# Patient Record
Sex: FEMALE | Race: WHITE | NOT HISPANIC OR LATINO | Employment: UNEMPLOYED | URBAN - METROPOLITAN AREA
[De-identification: names, ages, dates, MRNs, and addresses within clinical notes are randomized per-mention and may not be internally consistent; named-entity substitution may affect disease eponyms.]

---

## 2018-10-19 ENCOUNTER — OFFICE VISIT (OUTPATIENT)
Dept: FAMILY MEDICINE CLINIC | Facility: CLINIC | Age: 48
End: 2018-10-19

## 2018-10-19 VITALS
OXYGEN SATURATION: 98 % | SYSTOLIC BLOOD PRESSURE: 120 MMHG | HEART RATE: 83 BPM | BODY MASS INDEX: 20.82 KG/M2 | HEIGHT: 68 IN | DIASTOLIC BLOOD PRESSURE: 80 MMHG | TEMPERATURE: 98.3 F | WEIGHT: 137.4 LBS

## 2018-10-19 DIAGNOSIS — F98.8 ATTENTION DEFICIT DISORDER (ADD) WITHOUT HYPERACTIVITY: Primary | ICD-10-CM

## 2018-10-19 PROCEDURE — 99204 OFFICE O/P NEW MOD 45 MIN: CPT | Performed by: NURSE PRACTITIONER

## 2018-10-19 RX ORDER — BUPROPION HYDROCHLORIDE 150 MG/1
150 TABLET ORAL DAILY
Qty: 30 TABLET | Refills: 5 | Status: SHIPPED | OUTPATIENT
Start: 2018-10-19 | End: 2019-01-31

## 2018-10-22 NOTE — PROGRESS NOTES
Subjective   Kera Sage is a 48 y.o. female presents to [facility]. She has a history of Attention deficit disorder and previously prescribed Vyvanse. She has tried concerta and it seemed to work well for her. She had multiple testing completed at different offices. She has an integrated physician that she also sees. She is having more depressive symptoms recently due to unmanaged ADD.         ADD   This is a chronic problem. The current episode started more than 1 year ago. The problem occurs daily. The problem has been unchanged. Pertinent negatives include no abdominal pain, anorexia, arthralgias, change in bowel habit, chest pain, chills, congestion, coughing, diaphoresis, fatigue, fever, headaches, joint swelling, myalgias, nausea, neck pain, numbness, rash, sore throat, swollen glands, urinary symptoms, vertigo, visual change, vomiting or weakness. Nothing aggravates the symptoms. Treatments tried: vyvanse, concerta. The treatment provided moderate relief.        The following portions of the patient's history were reviewed and updated as appropriate: allergies, current medications, past family history, past medical history, past social history, past surgical history and problem list.    Review of Systems   Constitutional: Negative.  Negative for chills, diaphoresis, fatigue and fever.   HENT: Negative.  Negative for congestion and sore throat.    Eyes: Negative.    Respiratory: Negative.  Negative for cough.    Cardiovascular: Negative.  Negative for chest pain.   Gastrointestinal: Negative.  Negative for abdominal pain, anorexia, change in bowel habit, nausea and vomiting.   Endocrine: Negative.    Genitourinary: Negative.    Musculoskeletal: Negative.  Negative for arthralgias, joint swelling, myalgias and neck pain.   Skin: Negative.  Negative for rash.   Allergic/Immunologic: Negative.    Neurological: Negative.  Negative for vertigo, weakness, numbness and headaches.   Hematological: Negative.     Psychiatric/Behavioral: Positive for decreased concentration and dysphoric mood. Negative for self-injury, sleep disturbance and suicidal ideas. The patient is not nervous/anxious.        Objective   Physical Exam   Constitutional: She is oriented to person, place, and time. She appears well-developed and well-nourished.   HENT:   Head: Normocephalic and atraumatic.   Right Ear: Tympanic membrane and ear canal normal.   Left Ear: Tympanic membrane and ear canal normal.   Nose: Nose normal.   Mouth/Throat: Uvula is midline, oropharynx is clear and moist and mucous membranes are normal.   Eyes: Pupils are equal, round, and reactive to light. Conjunctivae are normal.   Neck: Neck supple.   Cardiovascular: Normal rate, regular rhythm and normal heart sounds.  Exam reveals no gallop and no friction rub.    No murmur heard.  Pulmonary/Chest: Effort normal and breath sounds normal. No respiratory distress. She has no wheezes. She has no rales.   Abdominal: Soft. Bowel sounds are normal. She exhibits no distension. There is no tenderness.   Neurological: She is alert and oriented to person, place, and time.   Skin: Skin is warm and dry.   Psychiatric: She has a normal mood and affect.   Vitals reviewed.      Assessment/Plan   Kera was seen today for get established and add.    Diagnoses and all orders for this visit:    Attention deficit disorder (ADD) without hyperactivity    Other orders  -     buPROPion XL (WELLBUTRIN XL) 150 MG 24 hr tablet; Take 1 tablet by mouth Daily.    will defer treatment to psychiatry for further management of ADD  Will start wellbutrin daily, may benefit from medication for ADD, may increase to 300 mg if tolerates well  Follow up with Dr. Aguilar as scheduled  Labs with dr aguilar

## 2019-03-27 ENCOUNTER — OFFICE VISIT (OUTPATIENT)
Dept: FAMILY MEDICINE CLINIC | Facility: CLINIC | Age: 49
End: 2019-03-27

## 2019-03-27 VITALS
WEIGHT: 138.6 LBS | SYSTOLIC BLOOD PRESSURE: 118 MMHG | RESPIRATION RATE: 20 BRPM | DIASTOLIC BLOOD PRESSURE: 82 MMHG | HEIGHT: 68 IN | HEART RATE: 99 BPM | OXYGEN SATURATION: 98 % | TEMPERATURE: 99.1 F | BODY MASS INDEX: 21.01 KG/M2

## 2019-03-27 DIAGNOSIS — Z12.11 COLON CANCER SCREENING: ICD-10-CM

## 2019-03-27 DIAGNOSIS — N64.4 BREAST PAIN, RIGHT: Primary | ICD-10-CM

## 2019-03-27 DIAGNOSIS — Z13.220 SCREENING FOR HYPERLIPIDEMIA: ICD-10-CM

## 2019-03-27 DIAGNOSIS — Z00.00 ANNUAL PHYSICAL EXAM: ICD-10-CM

## 2019-03-27 PROCEDURE — 99396 PREV VISIT EST AGE 40-64: CPT | Performed by: FAMILY MEDICINE

## 2019-03-27 PROCEDURE — 99213 OFFICE O/P EST LOW 20 MIN: CPT | Performed by: FAMILY MEDICINE

## 2019-03-27 RX ORDER — BUPROPION HYDROCHLORIDE 150 MG/1
1 TABLET ORAL DAILY
COMMUNITY
Start: 2019-03-11 | End: 2020-01-10 | Stop reason: SDUPTHER

## 2019-03-27 RX ORDER — MAGNESIUM OXIDE/MAG AA CHELATE 300 MG
300 CAPSULE ORAL NIGHTLY
COMMUNITY
End: 2022-08-22

## 2019-03-27 NOTE — PROGRESS NOTES
"Chief Complaint   Patient presents with   • Establish Care     NP to [facility]   • Advice Only     DEXA colonoscopy    • Breast Pain     right breast intermitent pain has OBGYN doesn't mammo    Previously seen by Dr. Mayuri Ramos. Pt is new to me, problems are new to me.    Subjective   Kera Sage is a 48 y.o. female.     History of Present Illness   ADD  She was tested about 6-7 years ago and is taking lisdexamfetamine. She continues to have trouble with learning. For 2 decades she says she trouble with short term memory, quick recall. She has had a neurologist but they attribute to her ADD and she knows that is not it. She also sees an integrative diet. She  Has disorder with sulfur metabolism. She still has elevated liver enzymes.  Was started on Keppra and her liver enzymes became elevated. She was on Keppra for over one year.  She sees Dr. Suze Cruz she is a neuropsychiatrist and has only seen her one time.  Her LMP was 3/12/19, very irregular. Won't have for few months then will bleed heavy    Breast pain  She has inverted nipple and breast imaging. Her last mammogram was 3/5/18 and was normal. She has hx of breast implants x2. will just have intermittent aching. She had breast augmentation surgery around age 23 years. Said she had textured implants and now they are on trial for increased risks of \"immune cancer\" she had explant surgery and new implants after her twins were 3 yo (now 16) and since those were in she had them removed in 8/2017. She also has inverted nipple on the right. She has screening mammogram for 7/2019 with gyn.    Colon cancer screening is due she just turned 48 years old.     The following portions of the patient's history were reviewed and updated as appropriate: allergies, current medications, past family history, past medical history, past social history, past surgical history and problem list.    Review of Systems   Constitutional: Negative for activity change, appetite change and " "unexpected weight change.   Respiratory: Negative for shortness of breath.    Cardiovascular: Negative for chest pain and leg swelling.   Gastrointestinal: Negative for blood in stool, constipation and diarrhea.       Vitals:    03/27/19 1519   BP: 118/82   BP Location: Right arm   Patient Position: Sitting   Cuff Size: Adult   Pulse: 99   Resp: 20   Temp: 99.1 °F (37.3 °C)   TempSrc: Oral   SpO2: 98%   Weight: 62.9 kg (138 lb 9.6 oz)   Height: 172.7 cm (68\")       Objective   Physical Exam   Constitutional: She is oriented to person, place, and time. She appears well-nourished. No distress.   HENT:   Right Ear: External ear normal.   Left Ear: External ear normal.   Nose: Nose normal.   Mouth/Throat: Oropharynx is clear and moist. No oropharyngeal exudate.   Bilateral ear canals and tympanic membranes appear normal.   Eyes: Conjunctivae and EOM are normal. Right eye exhibits no discharge. Left eye exhibits no discharge. No scleral icterus.   Neck: Neck supple. No thyromegaly present.   Cardiovascular: Normal rate, regular rhythm, normal heart sounds and intact distal pulses. Exam reveals no gallop and no friction rub.   No murmur heard.  Pulmonary/Chest: Effort normal and breath sounds normal. No respiratory distress. She has no wheezes. She has no rales. She exhibits no tenderness. Right breast exhibits no inverted nipple, no mass, no nipple discharge, no skin change and no tenderness. Left breast exhibits no inverted nipple, no mass, no nipple discharge, no skin change and no tenderness.   Abdominal: Soft. Bowel sounds are normal. She exhibits no distension and no mass. There is no tenderness. There is no guarding.   Musculoskeletal: She exhibits no edema or deformity.   Lymphadenopathy:     She has no cervical adenopathy.   Neurological: She is alert and oriented to person, place, and time. She exhibits normal muscle tone. Coordination normal.   Skin: Skin is warm and dry.   Psychiatric: She has a normal mood " and affect. Her behavior is normal.   Vitals reviewed.  she has inverted nipple on the right sided. She also has a tiny pustule in the right lower quadrant of the right breast.     Assessment/Plan   Kera was seen today for establish care, advice only and breast pain.    Diagnoses and all orders for this visit:    Breast pain, right  -     Mammo diagnostic right w CAD; Future  -     US breast right complete; Future    Colon cancer screening  -     Ambulatory Referral For Screening Colonoscopy    Screening for hyperlipidemia  -     Lipid Panel    Annual physical exam  -     Comprehensive Metabolic Panel  -     CBC & Differential  -     Lipid Panel  We discussed the importance of regular physical activity.  Cardiovascular activity for the equivalent of 30 minutes 5 days per week.  We also discussed the importance of healthy diet to avoid weight gain and sugar intolerance.  I recommend predominantly filling the diet with vegetables and lean meats followed by fruits and whole grains.  I also recommend overall avoiding processed foods.

## 2019-03-28 LAB
ALBUMIN SERPL-MCNC: 4.8 G/DL (ref 3.5–5.2)
ALBUMIN/GLOB SERPL: 2.1 G/DL
ALP SERPL-CCNC: 73 U/L (ref 39–117)
ALT SERPL-CCNC: 22 U/L (ref 1–33)
AST SERPL-CCNC: 20 U/L (ref 1–32)
BASOPHILS # BLD AUTO: 0.04 10*3/MM3 (ref 0–0.2)
BASOPHILS NFR BLD AUTO: 0.6 % (ref 0–1.5)
BILIRUB SERPL-MCNC: 0.4 MG/DL (ref 0.2–1.2)
BUN SERPL-MCNC: 18 MG/DL (ref 6–20)
BUN/CREAT SERPL: 23.7 (ref 7–25)
CALCIUM SERPL-MCNC: 9.6 MG/DL (ref 8.6–10.5)
CHLORIDE SERPL-SCNC: 100 MMOL/L (ref 98–107)
CHOLEST SERPL-MCNC: 146 MG/DL (ref 0–200)
CO2 SERPL-SCNC: 26.7 MMOL/L (ref 22–29)
CREAT SERPL-MCNC: 0.76 MG/DL (ref 0.57–1)
EOSINOPHIL # BLD AUTO: 0.03 10*3/MM3 (ref 0–0.4)
EOSINOPHIL NFR BLD AUTO: 0.4 % (ref 0.3–6.2)
ERYTHROCYTE [DISTWIDTH] IN BLOOD BY AUTOMATED COUNT: 12.6 % (ref 12.3–15.4)
GLOBULIN SER CALC-MCNC: 2.3 GM/DL
GLUCOSE SERPL-MCNC: 96 MG/DL (ref 65–99)
HCT VFR BLD AUTO: 44.9 % (ref 34–46.6)
HDLC SERPL-MCNC: 59 MG/DL (ref 40–60)
HGB BLD-MCNC: 14.4 G/DL (ref 12–15.9)
IMM GRANULOCYTES # BLD AUTO: 0.02 10*3/MM3 (ref 0–0.05)
IMM GRANULOCYTES NFR BLD AUTO: 0.3 % (ref 0–0.5)
LDLC SERPL CALC-MCNC: 57 MG/DL (ref 0–100)
LYMPHOCYTES # BLD AUTO: 1.57 10*3/MM3 (ref 0.7–3.1)
LYMPHOCYTES NFR BLD AUTO: 23 % (ref 19.6–45.3)
MCH RBC QN AUTO: 30.3 PG (ref 26.6–33)
MCHC RBC AUTO-ENTMCNC: 32.1 G/DL (ref 31.5–35.7)
MCV RBC AUTO: 94.5 FL (ref 79–97)
MONOCYTES # BLD AUTO: 0.48 10*3/MM3 (ref 0.1–0.9)
MONOCYTES NFR BLD AUTO: 7 % (ref 5–12)
NEUTROPHILS # BLD AUTO: 4.7 10*3/MM3 (ref 1.4–7)
NEUTROPHILS NFR BLD AUTO: 68.7 % (ref 42.7–76)
NRBC BLD AUTO-RTO: 0 /100 WBC (ref 0–0)
PLATELET # BLD AUTO: 339 10*3/MM3 (ref 140–450)
POTASSIUM SERPL-SCNC: 4 MMOL/L (ref 3.5–5.2)
PROT SERPL-MCNC: 7.1 G/DL (ref 6–8.5)
RBC # BLD AUTO: 4.75 10*6/MM3 (ref 3.77–5.28)
SODIUM SERPL-SCNC: 140 MMOL/L (ref 136–145)
TRIGL SERPL-MCNC: 148 MG/DL (ref 0–150)
VLDLC SERPL CALC-MCNC: 29.6 MG/DL (ref 5–40)
WBC # BLD AUTO: 6.84 10*3/MM3 (ref 3.4–10.8)

## 2019-04-01 ENCOUNTER — TELEPHONE (OUTPATIENT)
Dept: FAMILY MEDICINE CLINIC | Facility: CLINIC | Age: 49
End: 2019-04-01

## 2019-04-01 NOTE — TELEPHONE ENCOUNTER
Nataliya from the mammo dept called and said the order in the chart needs to be changed to bilateral instead of just the right

## 2019-04-02 DIAGNOSIS — N64.4 BREAST PAIN, RIGHT: Primary | ICD-10-CM

## 2019-04-08 ENCOUNTER — HOSPITAL ENCOUNTER (OUTPATIENT)
Dept: ULTRASOUND IMAGING | Facility: HOSPITAL | Age: 49
Discharge: HOME | End: 2019-04-08
Admitting: FAMILY MEDICINE

## 2019-04-08 ENCOUNTER — HOSPITAL ENCOUNTER (OUTPATIENT)
Dept: MAMMOGRAPHY | Facility: HOSPITAL | Age: 49
Discharge: HOME | End: 2019-04-08

## 2019-04-08 DIAGNOSIS — N64.4 BREAST PAIN, RIGHT: ICD-10-CM

## 2019-04-08 PROCEDURE — G0279 TOMOSYNTHESIS, MAMMO: HCPCS

## 2019-04-08 PROCEDURE — 76642 ULTRASOUND BREAST LIMITED: CPT

## 2019-04-08 PROCEDURE — 77066 DX MAMMO INCL CAD BI: CPT

## 2019-05-28 ENCOUNTER — OFFICE VISIT (OUTPATIENT)
Dept: NEUROLOGY | Facility: CLINIC | Age: 49
End: 2019-05-28

## 2019-05-28 VITALS
DIASTOLIC BLOOD PRESSURE: 70 MMHG | WEIGHT: 165.2 LBS | OXYGEN SATURATION: 98 % | HEIGHT: 68 IN | BODY MASS INDEX: 25.04 KG/M2 | HEART RATE: 73 BPM | SYSTOLIC BLOOD PRESSURE: 104 MMHG

## 2019-05-28 DIAGNOSIS — G40.109 TEMPORAL LOBE EPILEPSY (HCC): Primary | ICD-10-CM

## 2019-05-28 PROCEDURE — 99245 OFF/OP CONSLTJ NEW/EST HI 55: CPT | Performed by: PSYCHIATRY & NEUROLOGY

## 2019-05-28 NOTE — PATIENT INSTRUCTIONS
[facility]  Court Finnegan MD  Neurology clinic  [phone]    With anti-seizure medications, you may initially notice side effects of fatigue, drowsiness, unsteadiness, and dizziness.  Other possible side effects include nausea, abdominal pain, headache, blurry or double vision, slurred speech and mood changes.  Generally, patients will noticed these symptoms when the medication is first started or with higher doses and will go away with time.    It is import to consistently take your medication every day.  Missing just one dose may put you at risk for a breakthrough seizure.  Consider using reminders on your phone or a pill box.    If you develop a rash, please call the neurology clinic immediately or notify another healthcare professional, as this may be potentially life-threatening.  If you are unable to reach a healthcare professional, go to the emergency room immediately for further evaluation.    If you develop thoughts of wanting to hurt yourself or others, please call the neurology clinic immediately to notify another healthcare professional.  If you are unable to reach a healthcare professional, go to the emergency room immediately for further evaluation.    Taking anti-seizure medications may increase the risk of birth defects.  If you are a female of child-bearing potential, it is recommended that you take folic acid (1-4 mg) daily.  This may reduce the risk of birth defects while pregnant and taking seizure medication.  If you become pregnant, contact our office immediately. You will need to be followed very closely (at least monthly appointments).  I also recommend contacting The [employer] American Antiepileptic Drug Pregnancy Registry at www.aedpregnancyregistry.org or 1-503.145.4160.    It is the [place] state law that you cannot drive within 90 days of a seizure.    You should avoid certain activities that if you were to have a seizure, you could harm yourself or others. In  general, it is recommended that you avoid operating heavy machinery or power tools, swimming or taking baths by yourself (showers are ok), don't stand over open flames, don't get on high ladders or the roof.  I also recommend to avoid sleeping on your stomach.    For further information on epilepsy and resources available to patients and their families, please visit the [facility] at www.efky.org or call [phone].    Start vimpat 50 mg twice daily (every 12 hours) for 2 weeks, then increase to 100 mg twice daily.

## 2019-05-28 NOTE — PROGRESS NOTES
Subjective:     Patient ID: Kera Sage is a 49 y.o. female.    Ms. Sage is a 49 year old right handed female with a h/o anxiety, depression, and ADHD who is seen in consultation at the request of Dr. Suze Cruz for the evaluation of a previously abnormal EEG.  When she was 23, was in a wedding, out of the blue, couldn't remember what she was told.  Has to write things down.  Feels like things aren't connected.  Started having time lapses for about 8-10 years ago.  Would be driving down the road and would look up and wasn't sure how much time had passed.  Occurs a couple of times per week.  Only notices it when driving.  Was on LEV for a couple of years, but due to elevated LFTs, it was stopped. It did help symptoms.  Was on it for 3 years when the elevated LFTs were seen.  Has a hard time with retrieval of information.  Has a hard time with spelling.  Thinks that it may be related to her breast implants.  Had them explanted in 2017.  Has MTHFR.  Doesn't absorb B vitamins and body doesn't detoxify.  Also reports that she can't metabolize sulfur.  Has also had general anesthesia several times and feels that this may also contribute.  Overall, feels that her symptoms have gotten worse.  Have changed over time.  Had dreams where she felt out of body.  Does report vivid dreams.  Also reports a h/o sleep paralysis.  Once, her vision got weird for a 10 minute period, described a scintillating scotoma.  No associated headache.  No h/o convulsions.  Last one was yesterday.  Not currently on an AED.  Just had one EEG around 2015.  Thinks that it may have been an hour recording. Wellbutrin was started this past fall.  Symptoms didn't worsen after it was started.      Risk factors:  Childhood/febrile seizures? no  Head trauma/pathology? no  CNS infections? no  Family history of seizures? no  Driving? yes  Child bearing/family planning?  had vasectomy    I reviewed the patient's records.  I reviewed Dr. Roblero's  note from 2017 that discusses a routine EEG that showed left temporal sharps.  MRI brain in 2013 was normal.  I reviewed Dr. Cruz's note.  It discusses her episodes of losing time and referral to neurology was made.  Dr. Escoto did neuropsych testing on her in 2013. Full report is scanned into the chart.  It noted subtle dysfunction on language and a measure of sustained attention.        The following portions of the patient's history were reviewed and updated as appropriate: allergies, current medications, past family history, past medical history, past social history, past surgical history and problem list.    Review of Systems   Constitutional: Negative for activity change, appetite change and fatigue.   HENT: Negative for facial swelling, sinus pressure and trouble swallowing.    Eyes: Negative for pain, redness and visual disturbance.   Respiratory: Negative for chest tightness, shortness of breath and wheezing.    Cardiovascular: Negative for chest pain, palpitations and leg swelling.   Gastrointestinal: Negative for diarrhea, nausea and vomiting.   Endocrine: Negative for cold intolerance, heat intolerance and polyphagia.   Genitourinary: Negative for difficulty urinating, frequency and urgency.   Musculoskeletal: Negative for back pain, gait problem and neck pain.   Neurological: Negative for dizziness, tremors, seizures, syncope, facial asymmetry, speech difficulty, weakness, light-headedness, numbness and headaches.   Hematological: Does not bruise/bleed easily.   Psychiatric/Behavioral: Negative for agitation, behavioral problems, confusion, decreased concentration, dysphoric mood, hallucinations, self-injury, sleep disturbance and suicidal ideas. The patient is not nervous/anxious and is not hyperactive.     I reviewed the ROS documented by the MA.      Objective:    Neurologic Exam    Physical Exam   Constitutional:  Vital signs reviewed.  No apparent distress.  Well groomed.  Eyes:  No injection, no  icterus.  Fundoscopic exam performed.  No papilledema appreciated bilaterally.   Respiratory:  Normal effort.  Clear to auscultation bilaterally.  Cardiovascular:  Regular rate and rhythm.  No murmurs.  No carotid bruits. Symmetric radial pulses.  Musculoskeletal: Normal station.  Gait steady.  Normal arm swing.  Patient able to walk on heels and toes.  Tandem gait intact.  Romberg negative.  Muscle tone and bulk normal.  Strength is 5/5 in the bilateral upper and lower extremities proximally and distally unless otherwise specified in the neurological exam.  Skin:  No rashes.  Warm, dry, and intact.  Psychiatric:  Good mood.  Normal affect.    Neurologic:  Mental status-  The patient is alert and oriented to person, place and time. Attention/concentration is within normal limits.  Speech is fluent without dysarthria.  The patient is able to name, repeat and follow complex commands without difficulty.  Immediate memory and delayed recall intact (3/3 words immediate and after 4 minutes).  Fund of knowledge normal.  Cranial nerves- Pupils equally round and reactive to light with intact accomodation.  Visual fields intact.  Extraocular movements intact.  Facial sensation intact.  Smile symmetric.  Hearing intact to finger-rub bilaterally.  Palate elevates symmetrically.  SCM and trapezius are 5/5 bilaterally.  Tongue is midline.  Motor-  See musculoskeletal above.  No tremor.  Reflexes- 2+ in the bilateral triceps, biceps, brachioradialis, patellar and achilles.  Toes down-going bilaterally.  Sensation- Intact to pinprick and vibration in bilateral upper and lower extremities symmetrically.  Coordination- Intact to finger to nose and heel knee shin bilaterally.  Intact rapid alternating movements bilaterally.  Gait- See musculoskeletal exam above.     Assessment/Plan:  Ms. Sage is a 49 year old right handed female with a h/o anxiety, depression, and ADHD who presents to the neurology clinic today for evaluation of  lost time and cognitive issues.    1.  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  Her past EEG showed left temporal sharps.  Her symptoms improved while on LEV, but not currently on an AED.  Her neuropsych testing also seems to support this diagnosis.  She reports transaminitis on LEV, which would be rare since LEV is not metabolized through the liver.  Probably not a good option for her given her h/o mood disorder.  We discussed pros/cons of LTG and LCM.  Decided to go with LCM.  Will start on 50 mg BID for 2 weeks, then go up to 100 mg BID.  Will get EKG at next f/u for monitoring.  Discussed how wellbutrin can lower seizure threshold.  If symptoms aren't improved at next f/u, consider admission to EMU and/or repeat neuropsych testing.  We discussed no driving within 90 days of a seizure.       Problems Addressed this Visit     None      Visit Diagnoses     Temporal lobe epilepsy (CMS/HCC)    -  Primary

## 2019-06-03 ENCOUNTER — PREP FOR SURGERY (OUTPATIENT)
Dept: OTHER | Facility: HOSPITAL | Age: 49
End: 2019-06-03

## 2019-06-03 DIAGNOSIS — Z83.71 FAMILY HISTORY OF COLONIC POLYPS: ICD-10-CM

## 2019-06-03 DIAGNOSIS — Z12.11 SCREENING FOR COLON CANCER: Primary | ICD-10-CM

## 2019-06-17 PROBLEM — Z83.719 FAMILY HISTORY OF COLONIC POLYPS: Status: ACTIVE | Noted: 2019-06-17

## 2019-06-17 PROBLEM — Z12.11 SCREENING FOR COLON CANCER: Status: ACTIVE | Noted: 2019-06-17

## 2019-06-17 PROBLEM — Z83.71 FAMILY HISTORY OF COLONIC POLYPS: Status: ACTIVE | Noted: 2019-06-17

## 2019-06-26 ENCOUNTER — OFFICE VISIT (OUTPATIENT)
Dept: FAMILY MEDICINE CLINIC | Facility: CLINIC | Age: 49
End: 2019-06-26

## 2019-06-26 VITALS
WEIGHT: 136.2 LBS | HEIGHT: 68 IN | TEMPERATURE: 98.5 F | HEART RATE: 80 BPM | BODY MASS INDEX: 20.64 KG/M2 | OXYGEN SATURATION: 97 % | RESPIRATION RATE: 19 BRPM | DIASTOLIC BLOOD PRESSURE: 52 MMHG | SYSTOLIC BLOOD PRESSURE: 118 MMHG

## 2019-06-26 DIAGNOSIS — F98.8 ATTENTION DEFICIT DISORDER (ADD) WITHOUT HYPERACTIVITY: ICD-10-CM

## 2019-06-26 DIAGNOSIS — R41.3 MEMORY LOSS: Primary | ICD-10-CM

## 2019-06-26 DIAGNOSIS — G40.A09: ICD-10-CM

## 2019-06-26 PROCEDURE — 99214 OFFICE O/P EST MOD 30 MIN: CPT | Performed by: FAMILY MEDICINE

## 2019-06-26 RX ORDER — ESCITALOPRAM OXALATE 10 MG/1
10 TABLET ORAL DAILY
COMMUNITY
Start: 2019-06-10 | End: 2020-01-10 | Stop reason: SDUPTHER

## 2019-06-26 RX ORDER — LISDEXAMFETAMINE DIMESYLATE 70 MG/1
70 CAPSULE ORAL DAILY
COMMUNITY
Start: 2019-06-11 | End: 2020-03-04

## 2019-06-26 RX ORDER — LACOSAMIDE 100 MG/1
100 TABLET ORAL EVERY 12 HOURS SCHEDULED
COMMUNITY
End: 2019-09-05 | Stop reason: SDUPTHER

## 2019-06-26 NOTE — PROGRESS NOTES
Chief Complaint   Patient presents with   • Breast Pain     right 3 mth folow        Subjective   Kera Sage is a 49 y.o. female.     History of Present Illness   Patient was seen recently by her gynecologist office and had her annual visit there.  Checkout good.  She had recent breast imaging related to breast pain and history of implants.  She was found to have change on the screening mammogram but then follow-up with ultrasound and diagnostic mammogram there were no concerning findings everything appeared benign.    She continues to follow up with the new psychiatrist for her meds to be in one place and she has specialty in neurology and psych.     She was recently seen by her neurologist office and is followed here related to her memory complaints.  Around age 23 she had very much difficulties with her memory, could not remember what she was told.  From here on out she has had to write things down for memory.  She has hard time with information retrieval, spelling.  She took Keppra under the guidance of neurology but related to liver enzyme elevations this was discontinued.  Questionable history of seizure but her description per neurology also sounded like scintillating scotoma.  She has been on Wellbutrin and tolerated that well which would be contraindicated if she had seizures.  Neurology has listed left temporal lobe epilepsy and suspicion for untreated seizures.  Past EEG with changes in the left temporal area with sharps.  Her symptoms did improve on Keppra but she had to be taken off of it related to transaminitis and she has not been on another antiepileptic.  Neurology also felt her neuropsych testing seem to support diagnosis of left temporal lobe epilepsy.  She was started on Vimpat at that visit and titrated up.  If she does not have improvement of her symptoms on the Vimpat they considered admitting her to evaluate further for seizures.  No driving 90 days following seizure activity.  Wellbutrin  "can lower the seizure threshold.    Pt reports that the vimpat is helping with the time lapses. It has not helped the memory. Says the time lapses started occurring after the memory lapses so she does not think the two are connected.  She has a friend with lyme disease and has exactly the same symptoms as pt does. That friend also has joint pain and other symptoms. Really loving her pilates for the mind body connection. She exercises most days.     The following portions of the patient's history were reviewed and updated as appropriate: allergies, current medications, past medical history, past social history and problem list.    Review of Systems   Respiratory: Negative.    Cardiovascular: Negative.    Neurological: Negative.        Vitals:    06/26/19 0914   BP: 118/52   BP Location: Left arm   Patient Position: Sitting   Cuff Size: Adult   Pulse: 80   Resp: 19   Temp: 98.5 °F (36.9 °C)   TempSrc: Oral   SpO2: 97%   Weight: 61.8 kg (136 lb 3.2 oz)   Height: 172.7 cm (67.99\")       Objective   Physical Exam   Constitutional: She is oriented to person, place, and time. She appears well-nourished. No distress.   Eyes: Conjunctivae are normal. No scleral icterus.   Pulmonary/Chest: Effort normal. No respiratory distress.   Neurological: She is alert and oriented to person, place, and time.   Psychiatric: She has a normal mood and affect. Her behavior is normal.   Vitals reviewed.      Assessment/Plan   Kera was seen today for breast pain.    Diagnoses and all orders for this visit:    Memory loss  -     Lyme Disease, Western Blot    Attention deficit disorder (ADD) without hyperactivity    Atypical absence seizure (CMS/HCC)    Given new information pt has learned she would like to be tested for Lyme disease, neurological lyme disease, to explain her memory lapses/loss. She reported that her contact in [place] reported that she was negative on the screening test and then positive on the reflex testing. I have ordered the " testing and pt is aware that she may have to pay for this out of pocket. We will try to get a price for her. Additionally I'm unsure if this screening will  on the neurological lyme or if that would need to be done specifically on her CSF which would need to be discussed with her neurologist.     She seems to be doing overall much better. Her breast pain has resolved. She is doing well on the vimpat as far as she has not had any further time lapse episodes. She is also in better emotional state and mood. Still trouble with the memory that is very bothersome and sense that it is unrelated to her ADD which is being treated and the time lapse, now thought to be seizures based on neurology's review of her chart.

## 2019-07-02 ENCOUNTER — APPOINTMENT (OUTPATIENT)
Dept: LAB | Facility: HOSPITAL | Age: 49
End: 2019-07-02

## 2019-07-02 PROCEDURE — 86617 LYME DISEASE ANTIBODY: CPT | Performed by: FAMILY MEDICINE

## 2019-07-02 PROCEDURE — 36415 COLL VENOUS BLD VENIPUNCTURE: CPT | Performed by: FAMILY MEDICINE

## 2019-07-09 LAB
B BURGDOR IGG PATRN SER IB-IMP: NEGATIVE
B BURGDOR IGM PATRN SER IB-IMP: NEGATIVE
B BURGDOR18KD IGG SER QL IB: NORMAL
B BURGDOR23KD IGG SER QL IB: NORMAL
B BURGDOR23KD IGM SER QL IB: NORMAL
B BURGDOR28KD IGG SER QL IB: NORMAL
B BURGDOR30KD IGG SER QL IB: NORMAL
B BURGDOR39KD IGG SER QL IB: NORMAL
B BURGDOR39KD IGM SER QL IB: NORMAL
B BURGDOR41KD IGG SER QL IB: NORMAL
B BURGDOR41KD IGM SER QL IB: NORMAL
B BURGDOR45KD IGG SER QL IB: NORMAL
B BURGDOR58KD IGG SER QL IB: NORMAL
B BURGDOR66KD IGG SER QL IB: NORMAL
B BURGDOR93KD IGG SER QL IB: NORMAL

## 2019-07-22 ENCOUNTER — OFFICE VISIT (OUTPATIENT)
Dept: NEUROLOGY | Facility: CLINIC | Age: 49
End: 2019-07-22

## 2019-07-22 VITALS
SYSTOLIC BLOOD PRESSURE: 118 MMHG | OXYGEN SATURATION: 100 % | HEART RATE: 75 BPM | DIASTOLIC BLOOD PRESSURE: 80 MMHG | BODY MASS INDEX: 20.31 KG/M2 | WEIGHT: 134 LBS | HEIGHT: 68 IN

## 2019-07-22 DIAGNOSIS — Z79.899 HIGH RISK MEDICATION USE: ICD-10-CM

## 2019-07-22 DIAGNOSIS — G40.109 TEMPORAL LOBE EPILEPSY (HCC): Primary | ICD-10-CM

## 2019-07-22 PROCEDURE — 99214 OFFICE O/P EST MOD 30 MIN: CPT | Performed by: PSYCHIATRY & NEUROLOGY

## 2019-07-22 NOTE — PATIENT INSTRUCTIONS
**Eat a high fiber diet    **Exercise regularly (physically and mentally)    **Floss daily    **Consider eating yogurt regularly    **Consider drinking green tea    **Limit soda and alcohol

## 2019-07-22 NOTE — PROGRESS NOTES
Subjective:     Patient ID: Kera Sage is a 49 y.o. female.    Ms. Sage is a 49-year-old right-handed female with a history of anxiety, depression, and ADHD who presents to neurology clinic today as an established patient for follow-up.  The patient was last seen as a new patient on May 28, 2019 for an abnormal EEG.  The patient reports in her mid 20s she was attending a wedding and out of the blue she could not remember what she was told.  She had a start writing things down.  She felt like things were connected and started having time lapses for about 8 to 10 years.  These mainly occurred while she was driving.  And would occur a couple times per week.  She was on Keppra for about 3 years and that helped her symptoms however due to elevated LFTs the medication was stopped.  She also felt that may be her symptoms related to her breast implants and had them explanted in 2017.  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  She also felt that general anesthesia may be contributing to her symptoms.  Overall she felt like her symptoms had progressively worsened.  She also reported some vivid dreams and sleep paralysis.  She had also had an episode once that sounded like a scintillating scotoma.  She just had one EEG in 2015 that reportedly showed left temporal sharps.  At her last visit I felt that her symptoms were suspicious for untreated seizures.  We started her on lacosamide.  She has been on 100 twice a day for about a month now she denies any bad side effects and overall feels like her symptoms are little better.  She does ask if may be her symptoms are related to Lyme disease today.  She reports that she got checked and everything was negative.      The following portions of the patient's history were reviewed and updated as appropriate: allergies, current medications, past family history, past medical history, past social history, past surgical  history and problem list.    Review of Systems   Constitutional: Negative for activity change, appetite change and fatigue.   HENT: Negative for congestion, sinus pressure and trouble swallowing.    Eyes: Negative for photophobia, redness and visual disturbance.   Respiratory: Negative for chest tightness, shortness of breath and wheezing.    Cardiovascular: Negative for chest pain, palpitations and leg swelling.   Gastrointestinal: Negative for abdominal pain, constipation and nausea.   Endocrine: Negative for polydipsia, polyphagia and polyuria.   Genitourinary: Negative for difficulty urinating, frequency and urgency.   Musculoskeletal: Negative for back pain, gait problem and neck pain.   Skin: Negative for color change, rash and wound.   Allergic/Immunologic: Negative for environmental allergies, food allergies and immunocompromised state.   Neurological: Negative for dizziness, tremors, seizures, syncope, facial asymmetry, speech difficulty, weakness, light-headedness, numbness and headaches.   Hematological: Negative for adenopathy. Does not bruise/bleed easily.   Psychiatric/Behavioral: Positive for sleep disturbance. Negative for agitation, behavioral problems, confusion, decreased concentration, dysphoric mood, hallucinations, self-injury and suicidal ideas. The patient is not nervous/anxious and is not hyperactive.     I reviewed the ROS documented by the MA.        Objective:    Neurologic Exam    Physical Exam    Assessment/Plan:  The patient is a 49-year-old right-handed female with a history of anxiety, depression, and ADHD who presents to neurology clinic today for follow-up.    1.  Suspected temporal lobe epilepsy-her symptoms of memory loss are likely multifactorial.  If she tested negative for Lyme it is unlikely that she has this.  It is difficult to assess whether or not she is still having possible subclinical seizures.  I would like to do further EEG monitoring.  We discussed options of  in-home monitoring, amatory EEG, and inpatient epilepsy monitoring unit.  We decided to do the in-home monitoring.  I will continue her lacosamide today at 100 mill grams twice a day.  That can always be further titrated if needed.  I would also like to get an EKG for drug monitoring purposes.    A total of 25 minutes of face to face time was spent with the patient and >50% of that time was spent on counseling regarding their symptoms an plan of care.       Problems Addressed this Visit     None      Visit Diagnoses     High risk medication use    -  Primary    Relevant Orders    ECG 12 Lead    Temporal lobe epilepsy (CMS/HCC)        Relevant Orders    ECG 12 Lead    EEG In Home Monitoring

## 2019-08-15 ENCOUNTER — TELEPHONE (OUTPATIENT)
Dept: NEUROLOGY | Facility: CLINIC | Age: 49
End: 2019-08-15

## 2019-08-15 NOTE — TELEPHONE ENCOUNTER
----- Message from Sofie Calderon MA sent at 8/15/2019  9:20 AM EDT -----  Contact: [phone]      ----- Message -----  From: Aurelia Lind  Sent: 8/13/2019   9:03 AM  To: Sofie Calderon MA    Patient called just following up, to see if she is still supposed to have a EKG done.

## 2019-08-15 NOTE — TELEPHONE ENCOUNTER
----- Message from Aurelia Lind sent at 8/15/2019 11:01 AM EDT -----  Contact: [phone]  [facility] is out of network with patients insurance. Patient would like to know if there is any other way to have this test done other than through [facility].

## 2019-08-19 ENCOUNTER — ANESTHESIA EVENT (OUTPATIENT)
Dept: GASTROENTEROLOGY | Facility: HOSPITAL | Age: 49
End: 2019-08-19

## 2019-08-19 ENCOUNTER — HOSPITAL ENCOUNTER (OUTPATIENT)
Facility: HOSPITAL | Age: 49
Setting detail: HOSPITAL OUTPATIENT SURGERY
Discharge: HOME | End: 2019-08-19
Attending: SURGERY | Admitting: SURGERY

## 2019-08-19 ENCOUNTER — ANESTHESIA (OUTPATIENT)
Dept: GASTROENTEROLOGY | Facility: HOSPITAL | Age: 49
End: 2019-08-19

## 2019-08-19 VITALS
RESPIRATION RATE: 16 BRPM | WEIGHT: 137.8 LBS | BODY MASS INDEX: 20.88 KG/M2 | SYSTOLIC BLOOD PRESSURE: 119 MMHG | HEIGHT: 68 IN | DIASTOLIC BLOOD PRESSURE: 79 MMHG | TEMPERATURE: 98.2 F | HEART RATE: 58 BPM | OXYGEN SATURATION: 100 %

## 2019-08-19 PROBLEM — K57.30 DIVERTICULOSIS OF LARGE INTESTINE WITHOUT HEMORRHAGE: Status: ACTIVE | Noted: 2019-08-19

## 2019-08-19 PROBLEM — K64.8 INTERNAL HEMORRHOIDS: Status: ACTIVE | Noted: 2019-08-19

## 2019-08-19 LAB
B-HCG UR QL: NEGATIVE
INTERNAL NEGATIVE CONTROL: NEGATIVE
INTERNAL POSITIVE CONTROL: POSITIVE
Lab: NORMAL

## 2019-08-19 PROCEDURE — 25010000002 PROPOFOL 10 MG/ML EMULSION: Performed by: ANESTHESIOLOGY

## 2019-08-19 PROCEDURE — 45378 DIAGNOSTIC COLONOSCOPY: CPT | Performed by: SURGERY

## 2019-08-19 PROCEDURE — S0260 H&P FOR SURGERY: HCPCS | Performed by: SURGERY

## 2019-08-19 PROCEDURE — 81025 URINE PREGNANCY TEST: CPT | Performed by: SURGERY

## 2019-08-19 RX ORDER — SODIUM CHLORIDE 0.9 % (FLUSH) 0.9 %
3 SYRINGE (ML) INJECTION AS NEEDED
Status: DISCONTINUED | OUTPATIENT
Start: 2019-08-19 | End: 2019-08-19 | Stop reason: HOSPADM

## 2019-08-19 RX ORDER — LIDOCAINE HYDROCHLORIDE 20 MG/ML
INJECTION, SOLUTION INFILTRATION; PERINEURAL AS NEEDED
Status: DISCONTINUED | OUTPATIENT
Start: 2019-08-19 | End: 2019-08-19 | Stop reason: SURG

## 2019-08-19 RX ORDER — PROPOFOL 10 MG/ML
VIAL (ML) INTRAVENOUS CONTINUOUS PRN
Status: DISCONTINUED | OUTPATIENT
Start: 2019-08-19 | End: 2019-08-19 | Stop reason: SURG

## 2019-08-19 RX ORDER — PROPOFOL 10 MG/ML
VIAL (ML) INTRAVENOUS AS NEEDED
Status: DISCONTINUED | OUTPATIENT
Start: 2019-08-19 | End: 2019-08-19 | Stop reason: SURG

## 2019-08-19 RX ORDER — SODIUM CHLORIDE, SODIUM LACTATE, POTASSIUM CHLORIDE, CALCIUM CHLORIDE 600; 310; 30; 20 MG/100ML; MG/100ML; MG/100ML; MG/100ML
1000 INJECTION, SOLUTION INTRAVENOUS CONTINUOUS
Status: DISCONTINUED | OUTPATIENT
Start: 2019-08-19 | End: 2019-08-19 | Stop reason: HOSPADM

## 2019-08-19 RX ORDER — LIDOCAINE HYDROCHLORIDE 10 MG/ML
0.5 INJECTION, SOLUTION INFILTRATION; PERINEURAL ONCE AS NEEDED
Status: DISCONTINUED | OUTPATIENT
Start: 2019-08-19 | End: 2019-08-19 | Stop reason: HOSPADM

## 2019-08-19 RX ADMIN — SODIUM CHLORIDE, POTASSIUM CHLORIDE, SODIUM LACTATE AND CALCIUM CHLORIDE 1000 ML: 600; 310; 30; 20 INJECTION, SOLUTION INTRAVENOUS at 11:49

## 2019-08-19 RX ADMIN — PROPOFOL 100 MG: 10 INJECTION, EMULSION INTRAVENOUS at 12:00

## 2019-08-19 RX ADMIN — PROPOFOL 100 MCG/KG/MIN: 10 INJECTION, EMULSION INTRAVENOUS at 12:00

## 2019-08-19 RX ADMIN — LIDOCAINE HYDROCHLORIDE 100 MG: 20 INJECTION, SOLUTION INFILTRATION; PERINEURAL at 12:00

## 2019-08-19 NOTE — OP NOTE
Operative Note :  Karime Blanchard MD      Kera Sage  1970    Procedure Date: 08/19/19    Pre-op Diagnosis:  Screening for colon cancer [Z12.11]  Family history of colonic polyps [Z83.71]    Post-Operative Diagnosis:  Diverticulosis  Grade 1 internal hemorrhoids    Procedure:   Flexible colonoscopy to the cecum    Surgeon: Karime Blanchard MD    Assistant: None    Anesthesia:  MAC (monitored anesthetic care)    Estimated Blood Loss: Minimal    Specimens: None    Complications: None    Indications:  Mrs. Sage is a 49-year-old lady here for her first ever screening colonoscopy.  She has been counseled on the risks of the procedure to include bleeding, possible colon perforation, and possible missed pathology.  Despite these risks, she has consented to proceed.    Findings: Pancolonic diverticulosis, nonbleeding grade 1 internal hemorrhoids    Description of procedure:  The patient was brought to the endoscopy suite and hollie in the left lateral decubitus position.  Continuous propofol anesthesia was administered.  A surgical timeout was completed.  A digital rectal exam was performed, revealing no abnormalities.  An adult colonoscope was then inserted through the anus and passed under direct visualization to the level of the cecum.  The cecum was identified via the ileocecal valve as well as the appendiceal orifice.  The scope was then slowly withdrawn, examining all circumferential walls of the ascending, transverse, descending, and sigmoid colon.  There was pancolonic diverticulosis with multiple diverticula identified in the ascending, transverse, descending, and sigmoid colon.  1 of the diverticuli showed signs of fecal impaction along the hepatic flexure but there was no signs of mucosal erythema around this to suggest diverticulitis.  There was no sign of bleeding from any of the diverticula as well.  There were no polyps identified throughout the entire colon.  Within the rectum the scope was  retroflexed, showing nonbleeding grade 1 internal hemorrhoids.  The scope was then withdrawn and the colon desufflated.  The patient had a very good bowel prep and was transferred to the recovery area in stable condition.     Recommendations:  Repeat colonoscopy in 10 years for screening.    Karime Blanchard MD  General and Endoscopic Surgery  [facility]    [address], [address]  [place], [place], [address]  P: [phone]  F: [phone]     ]

## 2019-08-19 NOTE — ANESTHESIA POSTPROCEDURE EVALUATION
Patient: Kera Sage    Procedure Summary     Date:  08/19/19 Room / Location:   [facility]    Anesthesia Start:  1200 Anesthesia Stop:  1233    Procedure:  COLONOSCOPY into cecum (N/A ) Diagnosis:       Screening for colon cancer      Family history of colonic polyps      (Screening for colon cancer [Z12.11])      (Family history of colonic polyps [Z83.71])    Surgeon:  Karime Blanchard MD Provider:  Rolando Faulkner MD    Anesthesia Type:  MAC ASA Status:  2          Anesthesia Type: MAC  Last vitals  BP   119/68 (08/19/19 1232)   Temp   36.8 °C (98.2 °F) (08/19/19 1140)   Pulse   74 (08/19/19 1232)   Resp   15 (08/19/19 1232)     SpO2   100 % (08/19/19 1232)     Post Anesthesia Care and Evaluation    Patient location during evaluation: PHASE II  Anesthetic complications: No anesthetic complications

## 2019-08-19 NOTE — DISCHARGE INSTRUCTIONS

## 2019-08-19 NOTE — H&P
General Surgery  History and Physical    CC: Screening for colon cancer    HPI: The patient is a pleasant 49 y.o. year-old lady who presents today for a colonoscopic evaluation for colon cancer screening.  She has never previously undergone a flexible colonoscopy and denies any melena or hematochezia.    Past Medical History:   Endometriosis  Anxiety  Depression    Past Surgical History:   Bilateral breast augmentation  Removal of breast implants  Multiple laparoscopy procedures for endometriosis  Ovarian cystectomy  [place] tooth extraction  Left wrist surgery    Medications:   Medications Prior to Admission   Medication Sig Dispense Refill Last Dose   • B COMPLEX VITAMINS PO Take  by mouth.   Taking   • buPROPion XL (WELLBUTRIN XL) 150 MG 24 hr tablet Take 1 tablet by mouth Daily.   Taking   • escitalopram (LEXAPRO) 10 MG tablet Take 10 mg by mouth Daily.   Taking   • lacosamide (VIMPAT) 100 MG tablet tablet Take 100 mg by mouth Every 12 (Twelve) Hours.   Taking   • Magnesium 300 MG capsule Take 300 mg by mouth Every Night.   Taking   • PROBIOTIC PRODUCT PO Take  by mouth.   Taking   • VYVANSE 70 MG capsule Take 70 mg by mouth Daily   Not Taking       Allergies: No known drug allergies    Family History: No family history of gastrointestinal malignancy in her parents or siblings, her mother and father both had colon polyps    Social History: , homemaker, non-smoker, rare alcohol use    ROS: A comprehensive review of systems was conducted and significant only for the following positives: Anxiety  All other systems reviewed and negative    Physical Exam:  Vitals:    08/19/19 1140   BP: 117/82   Pulse: 75   Resp: 16   Temp: 98.2 °F (36.8 °C)   SpO2: 100%     General: No acute distress, well-nourished & well-developed  HEAD: normocephalic, atraumatic  EYES: normal conjunctiva, sclera anicteric  EARS: grossly normal hearing  NECK: supple, no thyromegaly  CARDIOVASCULAR: regular rate and rhythm  RESPIRATORY: clear  to auscultation bilaterally  GASTROINTESTINAL: soft, nontender, non-distended  PSYCHIATRIC: oriented x3, normal mood and affect    ASSESSMENT & PLAN  Mrs. Sage is a 49-year-old lady here for her first ever screening colonoscopy.  She has been counseled on the risks of the procedure to include bleeding, possible colon perforation, and possible missed pathology.  Despite these risks, she has consented to proceed.    Karime Blanchard MD  General and Endoscopic Surgery  [facility]    [address], [address]  [place], [place], [address]  P: [phone]  F: [phone]

## 2019-08-20 PROCEDURE — 95953 EEG IN HOME MONITORING: CPT | Performed by: PSYCHIATRY & NEUROLOGY

## 2019-08-21 PROCEDURE — 95953 PR EEG MONITORING/COMPUTER, EA 24 HOURS, UNATTENDED: CPT | Performed by: PSYCHIATRY & NEUROLOGY

## 2019-08-22 PROCEDURE — 95953 PR EEG MONITORING/COMPUTER, EA 24 HOURS, UNATTENDED: CPT | Performed by: PSYCHIATRY & NEUROLOGY

## 2019-09-05 ENCOUNTER — OFFICE VISIT (OUTPATIENT)
Dept: NEUROLOGY | Facility: CLINIC | Age: 49
End: 2019-09-05

## 2019-09-05 ENCOUNTER — HOSPITAL ENCOUNTER (OUTPATIENT)
Dept: CARDIOLOGY | Facility: HOSPITAL | Age: 49
Discharge: HOME | End: 2019-09-05
Admitting: PSYCHIATRY & NEUROLOGY

## 2019-09-05 VITALS
DIASTOLIC BLOOD PRESSURE: 86 MMHG | WEIGHT: 136.4 LBS | HEIGHT: 68 IN | SYSTOLIC BLOOD PRESSURE: 124 MMHG | BODY MASS INDEX: 20.67 KG/M2 | RESPIRATION RATE: 17 BRPM | HEART RATE: 83 BPM | OXYGEN SATURATION: 100 %

## 2019-09-05 DIAGNOSIS — Z79.899 HIGH RISK MEDICATION USE: ICD-10-CM

## 2019-09-05 DIAGNOSIS — G40.109 TEMPORAL LOBE EPILEPSY (HCC): Primary | ICD-10-CM

## 2019-09-05 PROCEDURE — 93005 ELECTROCARDIOGRAM TRACING: CPT | Performed by: PSYCHIATRY & NEUROLOGY

## 2019-09-05 PROCEDURE — 93010 ELECTROCARDIOGRAM REPORT: CPT | Performed by: INTERNAL MEDICINE

## 2019-09-05 PROCEDURE — 99213 OFFICE O/P EST LOW 20 MIN: CPT | Performed by: PSYCHIATRY & NEUROLOGY

## 2019-09-05 RX ORDER — LACOSAMIDE 100 MG/1
100 TABLET ORAL EVERY 12 HOURS SCHEDULED
Qty: 60 TABLET | Refills: 5 | Status: SHIPPED | OUTPATIENT
Start: 2019-09-05 | End: 2020-03-31 | Stop reason: SDUPTHER

## 2019-09-05 NOTE — PROGRESS NOTES
Subjective:     Patient ID: Kera Sage is a 49 y.o. female.    Ms. Sage is a 49-year-old right-handed female with history of anxiety, depression, and ADHD who presents to neurology clinic today as an established patient for follow-up.  The patient was last seen on July 22, 2019.  In summary she was initially seen in May for an abnormal EEG.  She reported in her mid 20s she was attending a wedding and out of the blue she could not remember what she was told.  She then had a start writing things down.  She felt like things were connected and started having time lapses for about 8 to 10 years.  These mainly occurred while she drove.  They may occur a couple times per week.  She was on Keppra for about 3 years and her symptoms improved however due to transaminitis the medication was stopped.  At one point she felt like her symptoms may be related to her breast implants and had them explanted in 2017.  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  Over time she felt like her symptoms have worsened.  She had an EEG in 2015 that reportedly showed left temporal sharps.  I felt like her symptoms were suspicious for untreated seizures.  We started on lacosamide and she is been on 100 mg twice a day.  This was started back in June.  She is denied side effects and her last visit felt that her symptoms were a little better.  I was not sure if may be her symptoms related subclinical seizures so was talked about more prolonged EEG monitoring.  She had a 72-hour EEG done August 20 through the 23rd.  It showed focal intermittent left temporal slowing as well as left temporal interictal epileptic discharges.  The patient did not have any seizures.  She follow-up since today to discuss these test results.  She reports that her symptoms are about the same as her last visit.  Some days are better than  others.  She usually has problems with multitasking as well as poor retention and retrieval.  She did have neuropsych testing done in 2015.  Those results are scanned into the chart.      The following portions of the patient's history were reviewed and updated as appropriate: allergies, current medications, past family history, past medical history, past social history, past surgical history and problem list.    Review of Systems   Constitutional: Negative for activity change, appetite change and fatigue.   HENT: Negative for facial swelling, trouble swallowing and voice change.    Eyes: Negative for photophobia, pain and visual disturbance.   Respiratory: Negative for chest tightness, shortness of breath and wheezing.    Cardiovascular: Negative for chest pain, palpitations and leg swelling.   Gastrointestinal: Negative for abdominal pain, constipation, diarrhea, nausea and vomiting.   Endocrine: Negative for polydipsia and polyphagia.   Musculoskeletal: Negative for back pain, gait problem and neck pain.   Skin: Negative for rash and wound.   Neurological: Negative for dizziness, tremors, seizures, syncope, facial asymmetry, speech difficulty, weakness, light-headedness, numbness and headaches.   Hematological: Does not bruise/bleed easily.   Psychiatric/Behavioral: Negative for agitation, behavioral problems, confusion, decreased concentration, dysphoric mood, hallucinations, self-injury, sleep disturbance and suicidal ideas. The patient is not nervous/anxious and is not hyperactive.     I reviewed the ROS documented by the MA.  All other systems negative.      Objective:    Neurologic Exam    Physical Exam    Assessment/Plan:  The patient is a 49-year-old right-handed female with history of anxiety, depression, and ADHD who presents to allergy clinic today for follow-up.    1.  Left temporal lobe epilepsy-I suspect that her clinical presentation is multifactorial.  There is likely a component of her psychiatric  diagnoses including ADHD.  I also suspect based on her EEGs that subclinical seizures may also have been contributing.  To the best of my knowledge, she is not continuing to have any further seizures.  Her 72-hour EEG was unrevealing.  I do recommend for her to continue on lacosamide 100 mg twice a day.  We had a very extensive conversation regarding her diagnosis and likely prognosis.  We discussed considering a repeat of her neuropsych testing; however I am not sure if that would have a major impact on her management.  The patient reported that she would not want to know if her symptoms have worsened over time and elected to not pursue repeat neuropsych testing which I think is reasonable.    A total of 15 minutes of face-to-face time was spent with the patient greater than 50% of this time was spent on counseling regarding her symptoms, test results, and plan of care.     Problems Addressed this Visit     None      Visit Diagnoses     Temporal lobe epilepsy (CMS/HCC)    -  Primary    Relevant Medications    lacosamide (VIMPAT) 100 MG tablet tablet    High risk medication use        Relevant Orders    ECG 12 Lead (Completed)

## 2019-09-09 ENCOUNTER — TELEPHONE (OUTPATIENT)
Dept: NEUROLOGY | Facility: CLINIC | Age: 49
End: 2019-09-09

## 2019-09-09 NOTE — TELEPHONE ENCOUNTER
----- Message from Court Finnegan MD sent at 9/5/2019  5:31 PM EDT -----  Ms. Sage, I want to let you know that your EKG came back and looks normal.  I do not recommend any changes to your current plan of care based on these results.  Please contact our office with any further questions or concerns.    Sincerely,  Court Finnegan MD  [facility]  [phone]

## 2019-12-02 ENCOUNTER — TELEPHONE (OUTPATIENT)
Dept: NEUROLOGY | Facility: CLINIC | Age: 49
End: 2019-12-02

## 2019-12-02 NOTE — TELEPHONE ENCOUNTER
----- Message from Gabrielle Salmeron sent at 11/26/2019  1:15 PM EST -----  Contact: [phone] cell kishore  Pt is paying 240$ a month for vimpat and she wanted to switch back to keppra please advise. morgan evans. Should pt not take until we send keppra or should she take it? Please advise or samples/ please advise, thanks.

## 2019-12-06 NOTE — TELEPHONE ENCOUNTER
Left message for patient to return call. Will give info for co-pay card if she is still interested in staying on medication

## 2019-12-06 NOTE — TELEPHONE ENCOUNTER
Spoke with patient. I activated co-pay card & called into [facility]. Insurance will not allow the pharmacy to run the prescription until 12/19 to check on price. Will call back them & fu with patient

## 2020-01-09 ENCOUNTER — TELEPHONE (OUTPATIENT)
Dept: FAMILY MEDICINE CLINIC | Facility: CLINIC | Age: 50
End: 2020-01-09

## 2020-01-09 NOTE — TELEPHONE ENCOUNTER
PT SEES PSYCHOLOGIST TO GET HER WELLBUTRIN XL 150MG AND LEXAPRO 10MG.  SHE SAID THEIR OFFICE HAS BEEN RAISING PRICES AND SHE CANNOT AFFORD TO KEEP GOING THERE.    SHE WANT TO KNOW IF YOU CAN PRESCRIBE THESE MEDICATIONS FOR HER  PLEASE ADVISE

## 2020-01-10 RX ORDER — ESCITALOPRAM OXALATE 10 MG/1
10 TABLET ORAL DAILY
Qty: 90 TABLET | Refills: 1 | Status: SHIPPED | OUTPATIENT
Start: 2020-01-10 | End: 2020-03-09

## 2020-01-10 RX ORDER — BUPROPION HYDROCHLORIDE 150 MG/1
150 TABLET ORAL DAILY
Qty: 90 TABLET | Refills: 1 | Status: SHIPPED | OUTPATIENT
Start: 2020-01-10 | End: 2020-03-04 | Stop reason: SDUPTHER

## 2020-02-25 ENCOUNTER — TELEPHONE (OUTPATIENT)
Dept: FAMILY MEDICINE CLINIC | Facility: CLINIC | Age: 50
End: 2020-02-25

## 2020-03-04 ENCOUNTER — OFFICE VISIT (OUTPATIENT)
Dept: NEUROLOGY | Facility: CLINIC | Age: 50
End: 2020-03-04

## 2020-03-04 VITALS
OXYGEN SATURATION: 98 % | HEIGHT: 68 IN | BODY MASS INDEX: 20.46 KG/M2 | DIASTOLIC BLOOD PRESSURE: 70 MMHG | HEART RATE: 78 BPM | WEIGHT: 135 LBS | SYSTOLIC BLOOD PRESSURE: 110 MMHG

## 2020-03-04 DIAGNOSIS — G40.109 TEMPORAL LOBE EPILEPSY (HCC): Primary | ICD-10-CM

## 2020-03-04 PROCEDURE — 99213 OFFICE O/P EST LOW 20 MIN: CPT | Performed by: PSYCHIATRY & NEUROLOGY

## 2020-03-04 RX ORDER — AMOXICILLIN AND CLAVULANATE POTASSIUM 875; 125 MG/1; MG/1
TABLET, FILM COATED ORAL
COMMUNITY
Start: 2020-02-25 | End: 2020-05-07

## 2020-03-04 RX ORDER — BUPROPION HYDROCHLORIDE 300 MG/1
300 TABLET ORAL EVERY MORNING
COMMUNITY
Start: 2020-02-05 | End: 2020-03-09 | Stop reason: SDUPTHER

## 2020-03-04 RX ORDER — BROMPHENIRAMINE MALEATE, PSEUDOEPHEDRINE HYDROCHLORIDE, AND DEXTROMETHORPHAN HYDROBROMIDE 2; 30; 10 MG/5ML; MG/5ML; MG/5ML
SYRUP ORAL
COMMUNITY
Start: 2020-02-25 | End: 2020-05-07

## 2020-03-04 NOTE — PROGRESS NOTES
Subjective:     Patient ID: Kera Sage is a 49 y.o. female.    The patient is a 49-year-old right-handed female with history of anxiety, depression, and ADHD who presents to the neurology clinic today as an established patient for follow-up for an abnormal EEG and seizures.  The patient was initially seen in May.  She reported in her mid 20s she was at a wedding and she had difficulty remembering what she was told.  She started having to write things down.  She also reported time lapses that occurred while she drove a couple times per week.  She was on Keppra for 3 years with improvement in her symptoms but due to transaminitis her medication was stopped.  She had an EEG in 2015 that showed left temporal sharps and we did a 72-hour EEG back in August 2019 that showed focal intermittent left temporal slowing and left temporal interictal epileptiform discharges.  Due to concerns for temporal lobe epilepsy the patient was started on lacosamide.  She continues on 100 mg twice a day without any side effects.  She also had neuropsych testing in 2015 and has declined repeat testing in the past.  Today she reports that she has not had anything definitively concerning for seizures.  She denies any side effects to her medication.  We did do an EKG at her last visit and her [place] interval was normal.  She is paying $20 a month for her medication.  She does report that she has difficulty multitasking and attending to more than 1 task.  She is trying to become a  and repetition has been helping.  She no longer has the time lapses while driving like she did before.    The following portions of the patient's history were reviewed and updated as appropriate: allergies, current medications, past family history, past medical history, past social history, past surgical history and problem list.    Review of Systems     Objective:    Neurologic Exam    Physical Exam    Assessment/Plan:  The patient is a 49-year-old  right-handed female with history of anxiety, depression, ADHD and likely left temporal lobe epilepsy who presents to the neurology clinic today for follow-up.    1.  Left temporal lobe epilepsy, not intractable, not in status-Fortunately the patient has had not had anything concerning for seizures and is tolerating her medication well with no side effects.  I recommend continuing on the current dose with no changes.  Fortunately no blood work or other diagnostics need to be done at this time.  The patient can follow-up in 1 year.  A total of 15 minutes of face-to-face time was spent with the patient greater than 50% that time spent on counseling regarding her symptoms and medication management.     Problems Addressed this Visit     None      Visit Diagnoses     Temporal lobe epilepsy (CMS/HCC)    -  Primary

## 2020-03-09 RX ORDER — BUPROPION HYDROCHLORIDE 300 MG/1
300 TABLET ORAL EVERY MORNING
Qty: 90 TABLET | Refills: 1 | Status: SHIPPED | OUTPATIENT
Start: 2020-03-09 | End: 2020-09-03

## 2020-03-09 RX ORDER — ESCITALOPRAM OXALATE 10 MG/1
TABLET ORAL
Qty: 30 TABLET | Refills: 0 | Status: SHIPPED | OUTPATIENT
Start: 2020-03-09 | End: 2020-04-03

## 2020-03-09 NOTE — TELEPHONE ENCOUNTER
Patient was calling to make sure the pharmacy had sent her refill requests for her escitalopram (LEXAPRO) 10 MG tablet and the buPROPion XL (WELLBUTRIN XL) 300 MG 24 hr tablet     Patient uses the [facility] pharmacy [address] [place] [address]     Patient can be reached [phone]     Patient stated she was totally out of the Lexapro but has a couple days left of the Wellbutrin

## 2020-03-31 RX ORDER — LACOSAMIDE 100 MG/1
100 TABLET ORAL EVERY 12 HOURS SCHEDULED
Qty: 60 TABLET | Refills: 5 | Status: SHIPPED | OUTPATIENT
Start: 2020-03-31 | End: 2020-04-01 | Stop reason: SDUPTHER

## 2020-04-01 RX ORDER — LACOSAMIDE 100 MG/1
100 TABLET ORAL EVERY 12 HOURS SCHEDULED
Qty: 60 TABLET | Refills: 5 | Status: SHIPPED | OUTPATIENT
Start: 2020-04-01 | End: 2020-06-03 | Stop reason: SDUPTHER

## 2020-04-02 ENCOUNTER — TELEPHONE (OUTPATIENT)
Dept: NEUROLOGY | Facility: CLINIC | Age: 50
End: 2020-04-02

## 2020-04-02 NOTE — TELEPHONE ENCOUNTER
I called [facility] and they informed me that the medication is ready for patient. I called and informed patient as well.

## 2020-04-02 NOTE — TELEPHONE ENCOUNTER
PT IS CALLING NEEDING HER VIMPAT SENT TO [facility] INSTEAD OF [facility].  [facility] CX THE REFILL THEY HAD.     PLS ADVISE PT WHEN SENT TO [facility].   [phone].

## 2020-04-03 RX ORDER — ESCITALOPRAM OXALATE 10 MG/1
TABLET ORAL
Qty: 30 TABLET | Refills: 0 | Status: SHIPPED | OUTPATIENT
Start: 2020-04-03 | End: 2020-05-07 | Stop reason: SDUPTHER

## 2020-05-07 ENCOUNTER — TELEMEDICINE (OUTPATIENT)
Dept: FAMILY MEDICINE CLINIC | Facility: CLINIC | Age: 50
End: 2020-05-07

## 2020-05-07 DIAGNOSIS — F41.9 ANXIETY: Primary | ICD-10-CM

## 2020-05-07 PROBLEM — R53.83 FATIGUE: Status: ACTIVE | Noted: 2020-05-07

## 2020-05-07 PROBLEM — N80.8 ENDOMETRIOSIS OF OTHER SPECIFIED SITES: Status: ACTIVE | Noted: 2020-05-07

## 2020-05-07 PROBLEM — M26.609 TEMPOROMANDIBULAR JOINT DISORDERS: Status: ACTIVE | Noted: 2020-05-07

## 2020-05-07 PROBLEM — G40.209 PARTIAL EPILEPSY WITH IMPAIRMENT OF CONSCIOUSNESS (HCC): Status: ACTIVE | Noted: 2020-05-07

## 2020-05-07 PROBLEM — R56.9 CONVULSIONS (HCC): Status: ACTIVE | Noted: 2020-05-07

## 2020-05-07 PROBLEM — R41.3 MEMORY LOSS: Status: ACTIVE | Noted: 2020-05-07

## 2020-05-07 PROCEDURE — 99214 OFFICE O/P EST MOD 30 MIN: CPT | Performed by: FAMILY MEDICINE

## 2020-05-07 RX ORDER — ESCITALOPRAM OXALATE 10 MG/1
10 TABLET ORAL DAILY
Qty: 30 TABLET | Refills: 1 | Status: SHIPPED | OUTPATIENT
Start: 2020-05-07 | End: 2020-05-13 | Stop reason: SDUPTHER

## 2020-05-07 NOTE — PROGRESS NOTES
Subjective   Kera Sage is a 50 y.o. female.     Chief Complaint   Patient presents with   • Anxiety        History of Present Illness  Seizures, anxiety, depression  She is on vimpat and thought to have temporal lobe epilepsy.   She started doing pilates and it has been the best thing. She started trying to be an instructed.   Mind body connection has really been helpful for her.   She is not doing this well at home. She is walking regularly   Since being on the vimpat has not noted any differences.  Says it is not the same with her losing time, learning issues. She has not noted any of the lost time.     She was seen by psychiatry. Adding the lexapro really   She had been on vyvanse and has not needed it very often.   brillia natural block of protein in the brain that does something with memory and focus and anxiety.     Night sweats for 8 months and 7-8 per night.      The following portions of the patient's history were reviewed and updated as appropriate: allergies, current medications, past medical history, past social history and problem list.      Review of Systems   Constitutional: Negative for activity change, appetite change and unexpected weight change.   Respiratory: Negative for shortness of breath.    Cardiovascular: Negative for chest pain and leg swelling.   Gastrointestinal: Negative for blood in stool, constipation and diarrhea.       Objective   There were no vitals taken for this visit.  Physical Exam   Constitutional: She is oriented to person, place, and time. She appears well-nourished. No distress.   Eyes: Conjunctivae are normal. No scleral icterus.   Pulmonary/Chest: Effort normal. No respiratory distress.   Neurological: She is alert and oriented to person, place, and time.   Psychiatric: She has a normal mood and affect. Her behavior is normal.       Assessment/Plan   Kera was seen today for anxiety.    Diagnoses and all orders for this visit:    Anxiety    Other orders  -      Discontinue: escitalopram (LEXAPRO) 10 MG tablet; Take 1 tablet by mouth Daily.      She is doing a lot better with handling the stress, memory issues, and dx or lack there of with her understanding and having to take a lot of notes. She is more accepting and that has helped with the anxiety. She feels the medication combination is also working well.     Patient gave consent today for a telehealth video visit as following recommendations of our governor and CDC during the COVID-19 pandemic.    25 min was spent in discussion with pt and greater than 50% of that time was spent counseling.

## 2020-05-16 RX ORDER — ESCITALOPRAM OXALATE 10 MG/1
10 TABLET ORAL DAILY
Qty: 90 TABLET | Refills: 1 | Status: SHIPPED | OUTPATIENT
Start: 2020-05-16 | End: 2020-12-21

## 2020-06-03 ENCOUNTER — TELEPHONE (OUTPATIENT)
Dept: NEUROLOGY | Facility: CLINIC | Age: 50
End: 2020-06-03

## 2020-06-03 DIAGNOSIS — G40.109 TEMPORAL LOBE EPILEPSY (HCC): Primary | ICD-10-CM

## 2020-06-03 RX ORDER — LACOSAMIDE 100 MG/1
100 TABLET ORAL EVERY 12 HOURS SCHEDULED
Qty: 180 TABLET | Refills: 3 | Status: SHIPPED | OUTPATIENT
Start: 2020-06-03 | End: 2020-12-14 | Stop reason: SDUPTHER

## 2020-06-03 NOTE — TELEPHONE ENCOUNTER
SAILAJA  [phone]    PT COST FOR HER 30DAY VIMPAT IS $240.00 WITH DISCOUNT CARD    BUT THE INSURANCE STATES SHE CAN GET THE 90 OF THE VIMPAT FOR THE SAME $240.OO COST IF THE DR WILL WRITE IT THAT WAY

## 2020-09-03 RX ORDER — BUPROPION HYDROCHLORIDE 300 MG/1
TABLET ORAL
Qty: 90 TABLET | Refills: 1 | Status: SHIPPED | OUTPATIENT
Start: 2020-09-03 | End: 2021-04-02

## 2020-09-12 NOTE — TELEPHONE ENCOUNTER
----- Message from Aurelia Lind sent at 8/15/2019  4:18 PM EDT -----  Contact: [phone]  Patient wants to know if she is to stop her medications for her in home EEG. She is scheduled for 8/20/19, at first she wasn't going to do it due to cost issues she has changed her mind.   
I don't recommend stopping her medications for the EEG.  If she is signed up for [facility], I would encourage her to send me messages directly.  It may be easier to communicate that way.  
LM letting patient know to not stop taking her medications just for the EEG and to utilize mychart as well.  
headache/complains of pain/discomfort

## 2020-11-03 ENCOUNTER — OFFICE VISIT (OUTPATIENT)
Dept: FAMILY MEDICINE CLINIC | Facility: CLINIC | Age: 50
End: 2020-11-03

## 2020-11-03 ENCOUNTER — HOSPITAL ENCOUNTER (OUTPATIENT)
Dept: GENERAL RADIOLOGY | Facility: HOSPITAL | Age: 50
Discharge: HOME | End: 2020-11-03
Admitting: FAMILY MEDICINE

## 2020-11-03 VITALS
SYSTOLIC BLOOD PRESSURE: 116 MMHG | WEIGHT: 145 LBS | BODY MASS INDEX: 21.98 KG/M2 | HEART RATE: 73 BPM | OXYGEN SATURATION: 99 % | HEIGHT: 68 IN | TEMPERATURE: 97.3 F | DIASTOLIC BLOOD PRESSURE: 60 MMHG | RESPIRATION RATE: 15 BRPM

## 2020-11-03 DIAGNOSIS — R10.2 PELVIC PAIN: ICD-10-CM

## 2020-11-03 DIAGNOSIS — M54.50 CHRONIC RIGHT-SIDED LOW BACK PAIN WITHOUT SCIATICA: Primary | ICD-10-CM

## 2020-11-03 DIAGNOSIS — G89.29 CHRONIC RIGHT-SIDED LOW BACK PAIN WITHOUT SCIATICA: Primary | ICD-10-CM

## 2020-11-03 PROCEDURE — 99214 OFFICE O/P EST MOD 30 MIN: CPT | Performed by: FAMILY MEDICINE

## 2020-11-03 PROCEDURE — 72110 X-RAY EXAM L-2 SPINE 4/>VWS: CPT

## 2020-11-03 NOTE — PROGRESS NOTES
"Chief Complaint   Patient presents with   • Back Pain     right side, radiates to the front pelvic area low, intermitent pain       Subjective   Kera Sage is a 50 y.o. female.     History of Present Illness   Started studying to be a  before march. She was doing well with her strength and doing well. Said in march she feels she hurt her back. Not a piercing pain. Just started hurting during a class. Says she did not hurt it during [employer]. She had just done a training and went well. She was doing a plank in a new class and it just started hurting.   Says it is all on the right side.  Hurts most with twisting and then releases the muscle that is when it hurts the most. Feels like she is having a dull pelvic/ovary pain as well. Says if the the back pain is bad and then starts to ease up it comes around the side of the right hip to the groin.   Improved with TENs and heating pad but this is temporary.     She then stopped everything   Did not do anything for months. Still hurt.   She went to [employer] and it hurts.   She stretches 2-4 times per week.   She went to PT and not helping. PT thought it was medius.   Massage, dry needling.   She also has TENs unit at home, not helping.   Standing or sitting for long periods makes it hurt.     Over one year of no periods.   Says her energy is good. She does have night sweats.   No complaints of bloating.       The following portions of the patient's history were reviewed and updated as appropriate: allergies, current medications, past family history, past medical history, past social history, past surgical history and problem list.    Review of Systems   Musculoskeletal: Positive for back pain.       /60 (BP Location: Left arm, Patient Position: Sitting, Cuff Size: Adult)   Pulse 73   Temp 97.3 °F (36.3 °C) (Temporal)   Resp 15   Ht 172.7 cm (68\")   Wt 65.8 kg (145 lb)   LMP  (LMP Unknown)   SpO2 99%   Breastfeeding No   BMI 22.05 kg/m² "       Objective   Physical Exam  Vitals signs reviewed.   Constitutional:       General: She is not in acute distress.  Eyes:      General: No scleral icterus.     Conjunctiva/sclera: Conjunctivae normal.   Pulmonary:      Effort: Pulmonary effort is normal. No respiratory distress.   Musculoskeletal: Normal range of motion.         General: No swelling, deformity or signs of injury.      Right lower leg: No edema.      Left lower leg: No edema.      Comments: She has good flexibility, negative straight leg, FADIR and KUN. She has pelvic discomfort with the KUN. Feels a stretching in the right pelvic area with right knee and hip flexion.   Neurological:      Mental Status: She is alert and oriented to person, place, and time.   Psychiatric:         Behavior: Behavior normal.         Assessment/Plan   Diagnoses and all orders for this visit:    1. Chronic right-sided low back pain without sciatica (Primary)  -     XR Spine Lumbar Complete 4+VW    2. Pelvic pain  -     US Pelvis Complete; Future  -     US Non-ob Transvaginal; Future  -     US testicular or ovarian vascular limited; Future      She has been limited in her activity.   Though she has tried to do stretching and working through it. She has been bothered with this pain for months now so I think it is best especially with concern for pelvic pain that we get imaging.

## 2020-11-11 DIAGNOSIS — M54.50 CHRONIC RIGHT-SIDED LOW BACK PAIN, UNSPECIFIED WHETHER SCIATICA PRESENT: Primary | ICD-10-CM

## 2020-11-11 DIAGNOSIS — G89.29 CHRONIC RIGHT-SIDED LOW BACK PAIN, UNSPECIFIED WHETHER SCIATICA PRESENT: Primary | ICD-10-CM

## 2020-11-16 ENCOUNTER — HOSPITAL ENCOUNTER (OUTPATIENT)
Dept: ULTRASOUND IMAGING | Facility: HOSPITAL | Age: 50
Discharge: HOME | End: 2020-11-16
Admitting: FAMILY MEDICINE

## 2020-11-16 DIAGNOSIS — R10.2 PELVIC PAIN: ICD-10-CM

## 2020-11-16 PROCEDURE — 76830 TRANSVAGINAL US NON-OB: CPT

## 2020-11-16 PROCEDURE — 93976 VASCULAR STUDY: CPT

## 2020-11-16 PROCEDURE — 76856 US EXAM PELVIC COMPLETE: CPT

## 2020-12-04 ENCOUNTER — APPOINTMENT (OUTPATIENT)
Dept: MRI IMAGING | Facility: HOSPITAL | Age: 50
End: 2020-12-04

## 2020-12-14 DIAGNOSIS — G40.109 TEMPORAL LOBE EPILEPSY (HCC): ICD-10-CM

## 2020-12-14 RX ORDER — LACOSAMIDE 100 MG/1
100 TABLET ORAL EVERY 12 HOURS SCHEDULED
Qty: 180 TABLET | Refills: 3 | Status: SHIPPED | OUTPATIENT
Start: 2020-12-14 | End: 2021-06-18

## 2020-12-14 NOTE — TELEPHONE ENCOUNTER
Pt last seen 3/4/2020. Follow up scheduled 3/8/2021.  Angel ran and uploaded.      Quantity 180 for 90 days.      Please review and approve or advise.     Thank you.

## 2020-12-17 ENCOUNTER — APPOINTMENT (OUTPATIENT)
Dept: MRI IMAGING | Facility: HOSPITAL | Age: 50
End: 2020-12-17

## 2020-12-21 RX ORDER — ESCITALOPRAM OXALATE 10 MG/1
TABLET ORAL
Qty: 90 TABLET | Refills: 1 | Status: SHIPPED | OUTPATIENT
Start: 2020-12-21 | End: 2021-07-02

## 2020-12-28 ENCOUNTER — TELEPHONE (OUTPATIENT)
Dept: FAMILY MEDICINE CLINIC | Facility: CLINIC | Age: 50
End: 2020-12-28

## 2020-12-28 DIAGNOSIS — M25.551 LATERAL PAIN OF RIGHT HIP: ICD-10-CM

## 2020-12-28 DIAGNOSIS — M54.41 CHRONIC RIGHT-SIDED LOW BACK PAIN WITH RIGHT-SIDED SCIATICA: Primary | ICD-10-CM

## 2020-12-28 DIAGNOSIS — R10.31 RIGHT GROIN PAIN: ICD-10-CM

## 2020-12-28 DIAGNOSIS — G89.29 CHRONIC RIGHT-SIDED LOW BACK PAIN WITH RIGHT-SIDED SCIATICA: Primary | ICD-10-CM

## 2020-12-28 NOTE — TELEPHONE ENCOUNTER
Spoke to patient and informed her that PT referral was placed and they should be contacting her.  She expressed understanding. She also asked about hormone labs, and I advised her to speak to Dr. Ramos about this at her upcoming appt on 1/8/2021.  She expressed understanding

## 2021-01-06 ENCOUNTER — APPOINTMENT (OUTPATIENT)
Dept: MRI IMAGING | Facility: HOSPITAL | Age: 51
End: 2021-01-06

## 2021-01-08 ENCOUNTER — OFFICE VISIT (OUTPATIENT)
Dept: FAMILY MEDICINE CLINIC | Facility: CLINIC | Age: 51
End: 2021-01-08

## 2021-01-08 VITALS
RESPIRATION RATE: 16 BRPM | OXYGEN SATURATION: 98 % | WEIGHT: 146.4 LBS | HEIGHT: 68 IN | HEART RATE: 78 BPM | TEMPERATURE: 96.8 F | SYSTOLIC BLOOD PRESSURE: 126 MMHG | DIASTOLIC BLOOD PRESSURE: 78 MMHG | BODY MASS INDEX: 22.19 KG/M2

## 2021-01-08 DIAGNOSIS — G89.29 CHRONIC RIGHT-SIDED LOW BACK PAIN WITH RIGHT-SIDED SCIATICA: ICD-10-CM

## 2021-01-08 DIAGNOSIS — Z86.32 HISTORY OF GESTATIONAL DIABETES: ICD-10-CM

## 2021-01-08 DIAGNOSIS — M54.41 CHRONIC RIGHT-SIDED LOW BACK PAIN WITH RIGHT-SIDED SCIATICA: ICD-10-CM

## 2021-01-08 DIAGNOSIS — E55.9 VITAMIN D DEFICIENCY: Primary | ICD-10-CM

## 2021-01-08 PROCEDURE — 99214 OFFICE O/P EST MOD 30 MIN: CPT | Performed by: FAMILY MEDICINE

## 2021-01-08 RX ORDER — VALACYCLOVIR HYDROCHLORIDE 500 MG/1
TABLET, FILM COATED ORAL
COMMUNITY
Start: 2020-12-24 | End: 2021-01-08 | Stop reason: SDUPTHER

## 2021-01-08 RX ORDER — PHENOL 1.4 %
600 AEROSOL, SPRAY (ML) MUCOUS MEMBRANE DAILY
COMMUNITY

## 2021-01-08 RX ORDER — CHLORAL HYDRATE 500 MG
1 CAPSULE ORAL DAILY
COMMUNITY

## 2021-01-08 NOTE — PROGRESS NOTES
"Chief Complaint   Patient presents with   • Back Pain     2 U       Subjective   Kera Sage is a 50 y.o. female.     History of Present Illness   Right back, hip, groin, posterior upper leg pain  Having a little improvement she says finally. There is certain movements that are still bothering her. If she does twisting for sure bothers her posterior leg. She worked with chiropractor. She was concerned for how long the pain was going on and that it seemed to be wrapping around the the pelvis. Imaging of the pelvis was reassuring.  Has been active again. She is lifting weights. She has never weighed this much even when she was pregnant with her twins.   Said she has hx of her A1c being elevated, fam hx of DM and then she also had gestational diabetes. She is worried about this because of her weight.     Feels like the vimpat has not changed her memory or helped her symptoms to this point. She was dx with temporal seizures and she wants to revisit the medication choice since this does not seem to be helping.    The following portions of the patient's history were reviewed and updated as appropriate: allergies, current medications, past family history, past medical history, past social history, past surgical history and problem list.    Review of Systems   Respiratory: Negative.    Musculoskeletal: Positive for back pain.   Neurological: Negative.        /78 (BP Location: Left arm, Patient Position: Sitting, Cuff Size: Small Adult)   Pulse 78   Temp 96.8 °F (36 °C) (Infrared)   Resp 16   Ht 172.7 cm (68\")   Wt 66.4 kg (146 lb 6.4 oz)   LMP  (LMP Unknown)   SpO2 98%   Breastfeeding No   BMI 22.26 kg/m²       Objective   Physical Exam  Vitals signs reviewed.   Constitutional:       General: She is not in acute distress.  Eyes:      General: No scleral icterus.     Conjunctiva/sclera: Conjunctivae normal.   Pulmonary:      Effort: Pulmonary effort is normal. No respiratory distress.   Neurological:      " Mental Status: She is alert and oriented to person, place, and time.   Psychiatric:         Behavior: Behavior normal.         Assessment/Plan   Diagnoses and all orders for this visit:    1. Vitamin D deficiency (Primary)  -     Vitamin D 25 Hydroxy    2. History of gestational diabetes  -     Hemoglobin A1c    3. Chronic right-sided low back pain with right-sided sciatica    Other orders  -     valACYclovir (VALTREX) 500 MG tablet; Take 1 tablet by mouth Daily.  Dispense: 90 tablet; Refill: 1        She is doing some better.   We talked about supplements and when to take D3 and how much calcium to take.   Also talked about weight and age and changes in metabolism. She is lifting weights and being active again which is great for her overall health even if she has not seen weight loss yet. She would like hormone testing done but I advised her that this should be done with her gynecologist.     She is going to be getting started on the PT. Delay because of new processes for COVID-19. She will start with soon and has already had recent improvements.    30 was spent with patient, and greater than 50% of the time was spent counseling regarding weight gain, metabolism, pain and PT, supplementing, risk of DM and lab work.

## 2021-01-09 LAB
25(OH)D3+25(OH)D2 SERPL-MCNC: 72.6 NG/ML (ref 30–100)
HBA1C MFR BLD: 5.5 % (ref 4.8–5.6)

## 2021-01-13 RX ORDER — VALACYCLOVIR HYDROCHLORIDE 500 MG/1
500 TABLET, FILM COATED ORAL DAILY
Qty: 90 TABLET | Refills: 1 | Status: SHIPPED | OUTPATIENT
Start: 2021-01-13 | End: 2023-03-06

## 2021-01-15 ENCOUNTER — TREATMENT (OUTPATIENT)
Dept: PHYSICAL THERAPY | Facility: CLINIC | Age: 51
End: 2021-01-15

## 2021-01-15 DIAGNOSIS — M54.16 RADICULOPATHY, LUMBAR REGION: Primary | ICD-10-CM

## 2021-01-15 PROCEDURE — 97161 PT EVAL LOW COMPLEX 20 MIN: CPT | Performed by: PHYSICAL THERAPIST

## 2021-01-15 PROCEDURE — 97140 MANUAL THERAPY 1/> REGIONS: CPT | Performed by: PHYSICAL THERAPIST

## 2021-01-15 PROCEDURE — 97110 THERAPEUTIC EXERCISES: CPT | Performed by: PHYSICAL THERAPIST

## 2021-01-15 NOTE — PROGRESS NOTES
Physical Therapy Initial Evaluation and Plan of Care      Patient: Kera Sage   : 1970  Diagnosis/ICD-10 Code:  Radiculopathy, lumbar region [M54.16]  Referring practitioner: Anh Ramos MD  Date of Initial Visit: 1/15/2021  Today's Date: 1/15/2021  Patient seen for 1 sessions           Subjective Questionnaire: Oswestry:       Subjective Evaluation    History of Present Illness  Date of onset: 2/10/2020  Mechanism of injury: Pt began having low back pain about a year ago and she cannot connect with an event.  She works out often and was doing Pilates but had been doing it for a while.  She has the most pain with extending the hip and lunging or overhead squats. The pain is achy. R low back to glute med  PMH: rectus diastasis surgery    She went to a chiropractor for graston, needling, manipulation and all of this made the pain worse.     XRAY FINDINGS: AP, lateral and bilateral oblique views of the lumbar spine demonstrate normal alignment. Moderate facet degenerative disease is present at L5-S1. There is mild dextroscoliosis with apex at the level of L4-L5. There is no evidence of fracture. Further evaluation could be performed with a MRI examination of the lumbar spine as indicated.    Subjective comment: low back painQuality of life: good    Pain  Current pain ratin  At best pain ratin  At worst pain ratin  Location: low back, R glute  Quality: throbbing and dull ache  Aggravating factors: squatting and lifting  Progression: improved    Diagnostic Tests  X-ray: abnormal    Treatments  Previous treatment: chiropractic  Current treatment: physical therapy  Patient Goals  Patient goals for therapy: decreased pain, increased strength and return to sport/leisure activities             Objective          Static Posture     Comments  Good posture  R PSIS low and prominent     Postural Observations  Seated posture: good  Standing posture: good        Palpation     Right Tenderness of the  lumbar paraspinals and quadratus lumborum.     Additional Palpation Details  R glute med mod TTP  Paraspinals min TTP  QL min/mod TTP    Tenderness     Lumbar Spine  Tenderness in the left transverse process.     Additional Tenderness Details  R L4-S1 TP TTP    Neurological Testing     Sensation     Lumbar   Left   Intact: light touch    Right   Intact: light touch    Active Range of Motion     Lumbar   Normal active range of motion  Extension: with pain  Right lateral flexion: with pain  Right rotation: with pain    Passive Range of Motion     Additional Passive Range of Motion Details  PIVM impaired R L3-S1    Strength/Myotome Testing     Left Hip   Planes of Motion   Flexion: 5  Extension: 4+  Abduction: 4+  External rotation: 5  Internal rotation: 5    Right Hip   Planes of Motion   Flexion: 4  Extension: 4  Abduction: 4  External rotation: 4+  Internal rotation: 4+    Left Knee   Flexion: 5  Extension: 5    Right Knee   Flexion: 5  Extension: 4    Left Ankle/Foot   Dorsiflexion: 5  Plantar flexion: 5    Right Ankle/Foot   Dorsiflexion: 5  Plantar flexion: 5    Muscle Activation   Patient able to activate left transverse abdominals, left external obliques, left multifidus, right transverse abdominals, right external obliques and right multifidus.     Tests     Lumbar     Right   Negative passive SLR.     Right Hip   Positive Gillet's.     Additional Tests Details  +KUN R    Ambulation     Observational Gait     Additional Observational Gait Details  Normal gait     General Comments     Lumbar Comments  HS, IT band WNL  R piriformis 32 degrees in supine 90/90  Pelvic landmark even in prone but R on R sacral torsion  R sacral base prominent          Assessment & Plan     Assessment  Impairments: abnormal or restricted ROM, activity intolerance, impaired physical strength, lacks appropriate home exercise program and pain with function  Assessment details: Pt presents with increased pain in the low back and  tenderness at all lumbar muscles and piriformis, QL and glute med on the R.  She has a positive KUN R but pelvic landmarks even in supine with R sacral base prominent. She has positive Gillets and weakness in B hips R>R. She is strong in her abdominals but as delayed R TA and multifidus activtation.  Her PIVM is impaired in the L3-S1 vertebra. She would benefit from skilled PT to address her deficits and reduce her pain. Xray revealed degenerative changes in the lumbar spine.   Prognosis: good  Functional Limitations: carrying objects, lifting, pulling, uncomfortable because of pain, moving in bed, sitting, standing and reaching overhead  Goals  Plan Goals: STG (in 4 weeks)  1) Pt will report a decrease in pain to 3/10 max   2) Pt will be able to complete HEP with no increase in pain and with proper technique  3) Pt will have minimal tenderness and no trigger points at R external hip rotators    LTG (in 8 weeks)  1) Pt will score 10% lower on the Oswestry  2) Pt will demonstrate improved B hip strength to 5/5 throughout  3) Pt will be able to return to sridhar workouts with only occasional complaints of pain  4) Pt will have normal pelvic alignment and negative KUN and Gilett's indicating reduced inflammation at the SI jt      Plan  Therapy options: will be seen for skilled physical therapy services  Planned modality interventions: high voltage pulsed current (pain management), high voltage pulsed current (spasm management), ultrasound, TENS, electrical stimulation/Russian stimulation, thermotherapy (hydrocollator packs), dry needling and traction  Planned therapy interventions: abdominal trunk stabilization, functional ROM exercises, flexibility, postural training, neuromuscular re-education, manual therapy, soft tissue mobilization, joint mobilization, home exercise program, stretching, strengthening, spinal/joint mobilization, balance/weight-bearing training and body mechanics training  Frequency: 2x  week  Duration in weeks: 12        Manual Therapy:    15     mins  85233;  Therapeutic Exercise:    12     mins  28756;     Neuromuscular Brina:    0    mins  46336;    Therapeutic Activity:     0     mins  26579;     Gait Trainin     mins  71200;     Ultrasound:     0     mins  17478;    Electrical Stimulation:    0     mins  66661 ( );  Dry Needling     0     mins self-pay    Timed Treatment:   27   mins   Total Treatment:     61   mins    PT SIGNATURE: Mesha Calloway, PT   DATE TREATMENT INITIATED: 1/15/2021    Initial Certification  Certification Period: 4/15/2021  I certify that the therapy services are furnished while this patient is under my care.  The services outlined above are required by this patient, and will be reviewed every 90 days.     PHYSICIAN: Anh Ramos MD      DATE:     Please sign and return via fax to [phone].. Thank you, [facility].

## 2021-01-19 ENCOUNTER — TREATMENT (OUTPATIENT)
Dept: PHYSICAL THERAPY | Facility: CLINIC | Age: 51
End: 2021-01-19

## 2021-01-19 DIAGNOSIS — M54.16 RADICULOPATHY, LUMBAR REGION: Primary | ICD-10-CM

## 2021-01-19 PROCEDURE — 97012 MECHANICAL TRACTION THERAPY: CPT | Performed by: PHYSICAL THERAPIST

## 2021-01-19 PROCEDURE — 97140 MANUAL THERAPY 1/> REGIONS: CPT | Performed by: PHYSICAL THERAPIST

## 2021-01-19 PROCEDURE — 97110 THERAPEUTIC EXERCISES: CPT | Performed by: PHYSICAL THERAPIST

## 2021-01-19 NOTE — PROGRESS NOTES
Physical Therapy Daily Progress Note        Patient: Kera Sage   : 1970  Diagnosis/ICD-10 Code:  Radiculopathy, lumbar region [M54.16]  Referring practitioner: Anh Ramos MD  Date of Initial Visit: Type: THERAPY  Noted: 1/15/2021  Today's Date: 2021  Patient seen for 2 sessions               Subjective Pt reports she felt good after last session and really optimistic.  She is hurting today and not sure if it was because of her massage or her workout yesterday. States she feels really stiff.     Objective   See Exercise, Manual, and Modality Logs for complete treatment.   TrA activation with abdomen protrusion    Hold time increased on hip abd/add to stabilize pelvis    R ASIS low, med malleolus low today    MET corrected pelvic innominate rotation today      Assessment/Plan Pt tolerated manual today but she does appear in a lot more pain today as compared with last week.  She needed much cueing on TrA activation so I won't move on to dynamic challenges until she gets better with this.  Too much compression of the spine the way she did it prior to cueing. She was able to correct with cues. Traction added since we don't know if a disc is bulging due to no MRI but her symptoms, age and activity are consistent with this as a possiblity.         Manual Therapy:    26     mins  06779;  Therapeutic Exercise:    12     mins  95831;     Neuromuscular Brina:    0    mins  17169;    Therapeutic Activity:     0     mins  21796;     Gait Trainin     mins  16472;     Ultrasound:     0     mins  50799;    Electrical Stimulation:    0     mins  25512 ( );  Dry Needling     0     mins self-pay  Traction  15 mins    Timed Treatment:   38   mins   Total Treatment:     53   mins    Mesha Calloway, PT  Physical Therapist

## 2021-01-21 ENCOUNTER — TREATMENT (OUTPATIENT)
Dept: PHYSICAL THERAPY | Facility: CLINIC | Age: 51
End: 2021-01-21

## 2021-01-21 DIAGNOSIS — M54.16 RADICULOPATHY, LUMBAR REGION: Primary | ICD-10-CM

## 2021-01-21 PROCEDURE — 97530 THERAPEUTIC ACTIVITIES: CPT | Performed by: PHYSICAL THERAPIST

## 2021-01-21 PROCEDURE — 97140 MANUAL THERAPY 1/> REGIONS: CPT | Performed by: PHYSICAL THERAPIST

## 2021-01-21 NOTE — PROGRESS NOTES
Physical Therapy Daily Progress Note      Patient: Kera Sage   : 1970  Referring practitioner: Anh Ramos MD  Date of Initial Visit: Type: THERAPY  Noted: 1/15/2021  Today's Date: 2021  Patient seen for 3 sessions         Kera Sage reports: she still feels a lot of pain with twisting motions. The traction from last visit helped a lot        Subjective     Objective   See Exercise, Manual, and Modality Logs for complete treatment.       Assessment/Plan   Kera continues to exhibit R hip rotation ROM limitations and tenderness to palpation of her R QL and transverse processes. She can activate TrA effectively but fatigues quickly.    Visit Diagnoses:    ICD-10-CM ICD-9-CM   1. Radiculopathy, lumbar region  M54.16 724.4       Progress per Plan of Care           Timed:  Manual Therapy:    32     mins  70370;  Therapeutic Exercise:         mins  42801;     Neuromuscular Brina:        mins  18657;    Therapeutic Activity:     16     mins  02891;     Gait Training:           mins  61500;     Ultrasound:          mins  24380;    Electrical Stimulation:         mins  43413 ( );    Untimed:  Electrical Stimulation:         mins  51101 ( );  Mechanical Traction:         mins  09941;     Timed Treatment:   48   mins   Total Treatment:     48   mins  Eleuterio Muhammad PT  Physical Therapist

## 2021-01-26 ENCOUNTER — TREATMENT (OUTPATIENT)
Dept: PHYSICAL THERAPY | Facility: CLINIC | Age: 51
End: 2021-01-26

## 2021-01-26 DIAGNOSIS — M54.16 RADICULOPATHY, LUMBAR REGION: Primary | ICD-10-CM

## 2021-01-26 PROCEDURE — 97110 THERAPEUTIC EXERCISES: CPT | Performed by: PHYSICAL THERAPIST

## 2021-01-26 PROCEDURE — 97140 MANUAL THERAPY 1/> REGIONS: CPT | Performed by: PHYSICAL THERAPIST

## 2021-01-26 PROCEDURE — 97530 THERAPEUTIC ACTIVITIES: CPT | Performed by: PHYSICAL THERAPIST

## 2021-01-28 ENCOUNTER — TREATMENT (OUTPATIENT)
Dept: PHYSICAL THERAPY | Facility: CLINIC | Age: 51
End: 2021-01-28

## 2021-01-28 DIAGNOSIS — M54.16 RADICULOPATHY, LUMBAR REGION: Primary | ICD-10-CM

## 2021-01-28 PROCEDURE — 97530 THERAPEUTIC ACTIVITIES: CPT | Performed by: PHYSICAL THERAPIST

## 2021-01-28 PROCEDURE — 97140 MANUAL THERAPY 1/> REGIONS: CPT | Performed by: PHYSICAL THERAPIST

## 2021-01-28 PROCEDURE — 97110 THERAPEUTIC EXERCISES: CPT | Performed by: PHYSICAL THERAPIST

## 2021-01-28 NOTE — PROGRESS NOTES
Physical Therapy Daily Treatment Note      Patient: Kera Sage   : 1970  Referring practitioner: Anh Ramos MD  Date of Initial Visit: Type: THERAPY  Noted: 1/15/2021  Today's Date: 2021  Patient seen for 5 sessions         Kera Sage reports: still with primary rightsided low back discomfort, some soreness from last sessions manual work.   Reports continuing to stretch multiple times a day as well as continue with pilates classes.        Subjective     Objective   -mild SI rotation noted with malalignment of pelvis and uneven medial malleoli of ankles; corrects post MET application  -decreased abnormal mm tone/tightness right lumbar pvm vs last treatment    See Exercise, Manual, and Modality Logs for complete treatment.   -Instructed in home SI self correction technique with ball squeeze and supine clamshells with black t band.  -Added Standing hip hikes R and standing hip ext with red t band R LE; quadraped LE raises/kick outs.    -educated regarding TA utilization and avoidance of lateral shift with quadraped activities.       Modalities - Hold traction application this session to assess response of activities/MET    Manual intervention/application R lumbar pvm and quadratus with STM/DTM to affected musculature and triggerpoints x 12 min.    Assessment/Plan  Positive response to manual intervention in regards to pelvic realignment as well as with STM/DTM right quadratus.  Able to progress exercise/activity without increased symptoms, but benefits from verbal/tactile cues to ensure proper hip positioning with quadraped activities.          Progress per Plan of Care toward all goals           Timed:  Manual Therapy:   12      mins  53254;  Therapeutic Exercise:   20      mins  49944;     Neuromuscular Brina:        mins  72069;    Therapeutic Activity:    12      mins  14428;     Gait Training:           mins  66443;     Ultrasound:          mins  76116;      Untimed:  Electrical Stimulation:          mins  78765 ( );  Mechanical Traction:         mins  42414;     Timed Treatment: 44     mins   Total Treatment:     44   mins  Jeremiah Aguilar, [facility]  Physical Therapist  Assistant  [id number]

## 2021-02-02 ENCOUNTER — TREATMENT (OUTPATIENT)
Dept: PHYSICAL THERAPY | Facility: CLINIC | Age: 51
End: 2021-02-02

## 2021-02-02 DIAGNOSIS — M54.16 RADICULOPATHY, LUMBAR REGION: Primary | ICD-10-CM

## 2021-02-02 PROCEDURE — 97110 THERAPEUTIC EXERCISES: CPT | Performed by: PHYSICAL THERAPIST

## 2021-02-02 PROCEDURE — 97140 MANUAL THERAPY 1/> REGIONS: CPT | Performed by: PHYSICAL THERAPIST

## 2021-02-02 PROCEDURE — G0283 ELEC STIM OTHER THAN WOUND: HCPCS | Performed by: PHYSICAL THERAPIST

## 2021-02-02 NOTE — PROGRESS NOTES
Physical Therapy Daily Progress Note      Patient: Kera Sage   : 1970  Diagnosis/ICD-10 Code:  Radiculopathy, lumbar region [M54.16]  Referring practitioner: Anh Ramos MD  Date of Initial Visit: Type: THERAPY  Noted: 1/15/2021  Today's Date: 2021  Patient seen for 6 sessions               Subjective Pt states she is feeling about the same and did not like having to be with different therapists.  She feels like everyone has a different approach and different idea of what needs to be done.     Objective   See Exercise, Manual, and Modality Logs for complete treatment.     Pelvic landmarks even  Assessment/Plan Emphasis today on incorporating TrA and multifidus into dynamic activity and mobs to the lumbar and pelvic areas since her greatest pain is at the R multifidus and vertebral area indicating likely compression of nerves in the spine.  Her innominate is even today so no MET. Responded well to estim         Manual Therapy:    26     mins  47865;  Therapeutic Exercise:    13     mins  98446;     Neuromuscular Brina:    0    mins  96027;    Therapeutic Activity:     0     mins  51607;     Gait Trainin     mins  69534;     Ultrasound:     0     mins  71104;    Electrical Stimulation:    15     mins  32048 ( );  Dry Needling     0     mins self-pay    Timed Treatment:   39   mins   Total Treatment:     54   mins    Mesha Calloway, PT  Physical Therapist

## 2021-02-16 ENCOUNTER — TREATMENT (OUTPATIENT)
Dept: PHYSICAL THERAPY | Facility: CLINIC | Age: 51
End: 2021-02-16

## 2021-02-16 DIAGNOSIS — M54.16 RADICULOPATHY, LUMBAR REGION: Primary | ICD-10-CM

## 2021-02-16 PROCEDURE — 97112 NEUROMUSCULAR REEDUCATION: CPT | Performed by: PHYSICAL THERAPIST

## 2021-02-16 PROCEDURE — 97140 MANUAL THERAPY 1/> REGIONS: CPT | Performed by: PHYSICAL THERAPIST

## 2021-02-16 PROCEDURE — 97530 THERAPEUTIC ACTIVITIES: CPT | Performed by: PHYSICAL THERAPIST

## 2021-02-16 NOTE — PROGRESS NOTES
Physical Therapy Daily Treatment Note      Patient: Kera Sage   : 1970  Referring practitioner: Anh Ramos MD  Date of Initial Visit: Type: THERAPY  Noted: 1/15/2021  Today's Date: 2021  Patient seen for 7 sessions         Kera Sage reports: she's been having more good days than bad days lately but she still notices some R sided flank/QL pain occasionally.     Subjective     Objective   See Exercise, Manual, and Modality Logs for complete treatment.       Assessment/Plan   Kera struggles to activate TrA and keep an even pelvis in a hooklying march exercise. If she performs heel slides instead she exhibits good activation and no rotation at her pelvis.    Visit Diagnoses:    ICD-10-CM ICD-9-CM   1. Radiculopathy, lumbar region  M54.16 724.4       Progress per Plan of Care           Timed:  Manual Therapy:    23     mins  08435;  Therapeutic Exercise:         mins  94810;     Neuromuscular Brina:    10    mins  97006;    Therapeutic Activity:     15     mins  29776;     Gait Training:           mins  93833;     Ultrasound:          mins  14786;    Electrical Stimulation:         mins  81272 ( );    Untimed:  Electrical Stimulation:         mins  98601 ( );  Mechanical Traction:         mins  83124;     Timed Treatment:   48   mins   Total Treatment:     48   mins  Nehemias Muhammad PT, DPT, OCS  Physical Therapist

## 2021-02-18 ENCOUNTER — TREATMENT (OUTPATIENT)
Dept: PHYSICAL THERAPY | Facility: CLINIC | Age: 51
End: 2021-02-18

## 2021-02-18 DIAGNOSIS — M54.16 RADICULOPATHY, LUMBAR REGION: Primary | ICD-10-CM

## 2021-02-18 PROCEDURE — 97112 NEUROMUSCULAR REEDUCATION: CPT | Performed by: PHYSICAL THERAPIST

## 2021-02-18 PROCEDURE — 97140 MANUAL THERAPY 1/> REGIONS: CPT | Performed by: PHYSICAL THERAPIST

## 2021-02-18 NOTE — PROGRESS NOTES
Physical Therapy Daily Treatment Note      Patient: Kera Sage   : 1970  Referring practitioner: Anh Ramos MD  Date of Initial Visit: Type: THERAPY  Noted: 1/15/2021  Today's Date: 2021  Patient seen for 8 sessions         Kera Sage reports: her back is feeling pretty good today but her hips are a little sore from her pilates workout yesterday.       Subjective     Objective   See Exercise, Manual, and Modality Logs for complete treatment.       Assessment/Plan  PT program was shortened by a few minutes due to late arrival of patient. She continues to have intolerance to lumbar and thoracic rotation due to pain and stiffness. She is exhibiting improved TrA activation but endurance is still impaired.  Visit Diagnoses:    ICD-10-CM ICD-9-CM   1. Radiculopathy, lumbar region  M54.16 724.4       Progress per Plan of Care           Timed:  Manual Therapy:    25     mins  89414;  Therapeutic Exercise:         mins  09081;     Neuromuscular Brina:    13    mins  15869;    Therapeutic Activity:          mins  70985;     Gait Training:           mins  66439;     Ultrasound:          mins  68588;    Electrical Stimulation:         mins  45459 ( );    Untimed:  Electrical Stimulation:         mins  09791 ( );  Mechanical Traction:         mins  15948;     Timed Treatment:   38   mins   Total Treatment:     38   mins  Nehemias Muhammad PT, DPT, OCS  Physical Therapist

## 2021-02-23 ENCOUNTER — TREATMENT (OUTPATIENT)
Dept: PHYSICAL THERAPY | Facility: CLINIC | Age: 51
End: 2021-02-23

## 2021-02-23 DIAGNOSIS — M54.16 RADICULOPATHY, LUMBAR REGION: Primary | ICD-10-CM

## 2021-02-23 PROCEDURE — 97530 THERAPEUTIC ACTIVITIES: CPT | Performed by: PHYSICAL THERAPIST

## 2021-02-23 PROCEDURE — 97112 NEUROMUSCULAR REEDUCATION: CPT | Performed by: PHYSICAL THERAPIST

## 2021-02-23 PROCEDURE — 97140 MANUAL THERAPY 1/> REGIONS: CPT | Performed by: PHYSICAL THERAPIST

## 2021-02-23 NOTE — PROGRESS NOTES
Physical Therapy Daily Treatment Note      Patient: Kera Sage   : 1970  Referring practitioner: Anh Ramos MD  Date of Initial Visit: Type: THERAPY  Noted: 1/15/2021  Today's Date: 2021  Patient seen for 9 sessions         Kera Sage reports: she understands that she may always have some lumbar tightness and trigger points after she works out due to her scoliotic lumbar curve. She feels like between some cupping sessions with a massage therapist and PT she has really made some good progress.    Subjective     Objective          Tests     Right Hip   Positive KUN.   Negative FADIR and scour.       See Exercise, Manual, and Modality Logs for complete treatment.       Assessment/Plan   Kera exhibits capsular tightness of her R hip and tight hip adductors on the same side. Her R hip hypomobility aggravates her R sided QL and low back pain consistently. She needs extensive verbal and tactile cueing for hip extensor activation in single leg stance.    Visit Diagnoses:    ICD-10-CM ICD-9-CM   1. Radiculopathy, lumbar region  M54.16 724.4       Progress per Plan of Care           Timed:  Manual Therapy:    25     mins  93997;  Therapeutic Exercise:         mins  59195;     Neuromuscular Brina:    10    mins  35578;    Therapeutic Activity:     10     mins  02524;     Gait Training:           mins  51518;     Ultrasound:          mins  72843;    Electrical Stimulation:         mins  58236 ( );    Untimed:  Electrical Stimulation:         mins  95038 ( );  Mechanical Traction:         mins  73754;     Timed Treatment:   45   mins   Total Treatment:     45   mins  Nehemias Muhammad PT, DPT, OCS  Physical Therapist

## 2021-02-26 ENCOUNTER — TREATMENT (OUTPATIENT)
Dept: PHYSICAL THERAPY | Facility: CLINIC | Age: 51
End: 2021-02-26

## 2021-02-26 DIAGNOSIS — M54.16 RADICULOPATHY, LUMBAR REGION: Primary | ICD-10-CM

## 2021-02-26 PROCEDURE — 97140 MANUAL THERAPY 1/> REGIONS: CPT | Performed by: PHYSICAL THERAPIST

## 2021-02-26 PROCEDURE — 97110 THERAPEUTIC EXERCISES: CPT | Performed by: PHYSICAL THERAPIST

## 2021-02-26 PROCEDURE — 97530 THERAPEUTIC ACTIVITIES: CPT | Performed by: PHYSICAL THERAPIST

## 2021-02-26 NOTE — PROGRESS NOTES
Physical Therapy Daily Treatment Note      Patient: Kera Sage   : 1970  Referring practitioner: Anh Ramos MD  Date of Initial Visit: Type: THERAPY  Noted: 1/15/2021  Today's Date: 2021  Patient seen for 10 sessions         Kera Sage reports: she's definitely having more good days than bad days, but she hasn't been able to tolerate more intense workouts due to her pain.  Subjective Questionnaire: Oswestry: 24% disability      Subjective Evaluation    Pain  Current pain ratin  At best pain ratin  At worst pain ratin  Quality: dull ache  Relieving factors: rest  Aggravating factors: movement  Progression: improved    Treatments  Previous treatment: chiropractic         Objective          Palpation     Right   No palpable tenderness to the gluteus medius and piriformis.     Tenderness     Right Hip   No tenderness in the greater trochanter.     Strength/Myotome Testing     Right Hip   Planes of Motion   Flexion: 4+  Extension: 4-  Abduction: 4  External rotation: 4  Internal rotation: 4    Isolated Muscles   Gluteus maximums: 4-    Tests     Right Hip   Negative KUN and Gillet's.       See Exercise, Manual, and Modality Logs for complete treatment.       Assessment & Plan     Assessment  Impairments: abnormal muscle tone, activity intolerance and pain with function  Assessment details: Kera has met 2/3 STG and 2/4 long term goals. She is most limited in her progress at this point due to decreased R hip strength. She would benefit from additional skilled therapy to address her hip strength in order for her to return to [employer] workouts and ADLs with no increase in her back pain.  Prognosis: good  Functional Limitations: lifting, walking, pulling, pushing, sitting and unable to perform repetitive tasks  Goals  Plan Goals: Plan Goals: STG (in 4 weeks)  1) Pt will report a decrease in pain to 3/10 max (not met)  2) Pt will be able to complete HEP with no increase in pain and with  proper technique (met 2/26/2021)  3) Pt will have minimal tenderness and no trigger points at R external hip rotators (met 2/26/2021)    LTG (in 8 weeks)  1) Pt will score 10% lower on the Oswestry (not met)  2) Pt will demonstrate improved B hip strength to 5/5 throughout (not met)  3) Pt will be able to return to sridhar workouts with only occasional complaints of pain (met 2/26/2021)  4) Pt will have normal pelvic alignment and negative KUN and Gilett's indicating reduced inflammation at the SI jt (met 2/26/2021)    Plan  Therapy options: will be seen for skilled physical therapy services  Planned modality interventions: TENS, dry needling, cryotherapy, thermotherapy (hydrocollator packs), traction and ultrasound  Planned therapy interventions: ADL retraining, abdominal trunk stabilization, body mechanics training, fine motor coordination training, flexibility, functional ROM exercises, home exercise program, IADL retraining, joint mobilization, therapeutic activities, stretching, strengthening, spinal/joint mobilization, soft tissue mobilization, neuromuscular re-education, motor coordination training and manual therapy  Frequency: 2x week  Duration in weeks: 4  Treatment plan discussed with: patient        Visit Diagnoses:    ICD-10-CM ICD-9-CM   1. Radiculopathy, lumbar region  M54.16 724.4       Progress per Plan of Care           Timed:  Manual Therapy:    20     mins  57465;  Therapeutic Exercise:    10     mins  73945;     Neuromuscular Brina:        mins  67513;    Therapeutic Activity:     15     mins  75857;     Gait Training:           mins  73422;     Ultrasound:          mins  33653;    Electrical Stimulation:         mins  09419 ( );    Untimed:  Electrical Stimulation:         mins  64218 ( );  Mechanical Traction:         mins  35693;     Timed Treatment:   45   mins   Total Treatment:     45   mins  Nehemias Muhammad PT, DPT, OCS  Physical Therapist

## 2021-03-02 ENCOUNTER — TREATMENT (OUTPATIENT)
Dept: PHYSICAL THERAPY | Facility: CLINIC | Age: 51
End: 2021-03-02

## 2021-03-02 DIAGNOSIS — M54.16 RADICULOPATHY, LUMBAR REGION: Primary | ICD-10-CM

## 2021-03-02 PROCEDURE — 97140 MANUAL THERAPY 1/> REGIONS: CPT | Performed by: PHYSICAL THERAPIST

## 2021-03-02 PROCEDURE — 97530 THERAPEUTIC ACTIVITIES: CPT | Performed by: PHYSICAL THERAPIST

## 2021-03-02 NOTE — PROGRESS NOTES
Physical Therapy Daily Treatment Note      Patient: Kera Sage   : 1970  Referring practitioner: Anh Ramos MD  Date of Initial Visit: Type: THERAPY  Noted: 1/15/2021  Today's Date: 3/2/2021  Patient seen for 11 sessions         Kera Sage reports: she is sore from her workout yesterday and having some difficulty with the hip strengthening exercises we discussed last visit.     Subjective     Objective   See Exercise, Manual, and Modality Logs for complete treatment.       Assessment/Plan  Kera struggles to maintain even pelvis in single leg stance due to bilateral gluteus medius weakness. Side planks also cause some back pain on the R side for her.  Visit Diagnoses:    ICD-10-CM ICD-9-CM   1. Radiculopathy, lumbar region  M54.16 724.4       Progress per Plan of Care           Timed:  Manual Therapy:    30     mins  56453;  Therapeutic Exercise:         mins  14239;     Neuromuscular Brina:        mins  70132;    Therapeutic Activity:     15     mins  56051;     Gait Training:           mins  12409;     Ultrasound:          mins  05214;    Electrical Stimulation:         mins  55778 ( );    Untimed:  Electrical Stimulation:         mins  41348 ( );  Mechanical Traction:         mins  70581;     Timed Treatment:   45   mins   Total Treatment:     45   mins  Nehemias Muhammad PT, DPT, OCS  Physical Therapist

## 2021-03-05 ENCOUNTER — TREATMENT (OUTPATIENT)
Dept: PHYSICAL THERAPY | Facility: CLINIC | Age: 51
End: 2021-03-05

## 2021-03-05 DIAGNOSIS — M54.16 RADICULOPATHY, LUMBAR REGION: Primary | ICD-10-CM

## 2021-03-05 PROCEDURE — 97112 NEUROMUSCULAR REEDUCATION: CPT | Performed by: PHYSICAL THERAPIST

## 2021-03-05 PROCEDURE — 97140 MANUAL THERAPY 1/> REGIONS: CPT | Performed by: PHYSICAL THERAPIST

## 2021-03-05 NOTE — PROGRESS NOTES
Physical Therapy Daily Treatment Note      Patient: Kera Sage   : 1970  Referring practitioner: Anh Ramos MD  Date of Initial Visit: Type: THERAPY  Noted: 1/15/2021  Today's Date: 3/5/2021  Patient seen for 12 sessions         Kera Sage reports: she's still working on her HEP and doing pilates regularly. She can feel her R hip and pelvis getting stronger.      Subjective     Objective   See Exercise, Manual, and Modality Logs for complete treatment.       Assessment/Plan  Kera continues to exhibit hip ER ROM deficits and diminished R gluetus sonali and gluteus medius strength that compromise her pelvic stability. With verbal and tactile cueing she can correct her form adequately.  Visit Diagnoses:    ICD-10-CM ICD-9-CM   1. Radiculopathy, lumbar region  M54.16 724.4       Progress per Plan of Care           Timed:  Manual Therapy:    35     mins  79690;  Therapeutic Exercise:         mins  78307;     Neuromuscular Brina: 10       mins  26328;    Therapeutic Activity:          mins  22798;     Gait Training:           mins  19406;     Ultrasound:          mins  03524;    Electrical Stimulation:         mins  78117 ( );    Untimed:  Electrical Stimulation:         mins  86323 ( );  Mechanical Traction:         mins  67270;     Timed Treatment:   45   mins   Total Treatment:     45   mins  Nehemias Muhammad PT, DPT, OCS  Physical Therapist

## 2021-03-08 ENCOUNTER — OFFICE VISIT (OUTPATIENT)
Dept: NEUROLOGY | Facility: CLINIC | Age: 51
End: 2021-03-08

## 2021-03-08 ENCOUNTER — LAB (OUTPATIENT)
Dept: LAB | Facility: HOSPITAL | Age: 51
End: 2021-03-08

## 2021-03-08 VITALS
HEART RATE: 78 BPM | BODY MASS INDEX: 21.82 KG/M2 | SYSTOLIC BLOOD PRESSURE: 118 MMHG | OXYGEN SATURATION: 99 % | DIASTOLIC BLOOD PRESSURE: 88 MMHG | WEIGHT: 144 LBS | HEIGHT: 68 IN

## 2021-03-08 DIAGNOSIS — R41.89 COGNITIVE CHANGES: Primary | ICD-10-CM

## 2021-03-08 DIAGNOSIS — R41.89 COGNITIVE CHANGES: ICD-10-CM

## 2021-03-08 LAB
FOLATE SERPL-MCNC: 16.91 NG/ML (ref 4.78–24.2)
HCYS SERPL-MCNC: 7.1 UMOL/L (ref 0–15)
TSH SERPL DL<=0.05 MIU/L-ACNC: 1.62 UIU/ML (ref 0.27–4.2)
VIT B12 BLD-MCNC: 422 PG/ML (ref 211–946)

## 2021-03-08 PROCEDURE — 82746 ASSAY OF FOLIC ACID SERUM: CPT | Performed by: PSYCHIATRY & NEUROLOGY

## 2021-03-08 PROCEDURE — 84443 ASSAY THYROID STIM HORMONE: CPT | Performed by: PSYCHIATRY & NEUROLOGY

## 2021-03-08 PROCEDURE — 82607 VITAMIN B-12: CPT | Performed by: PSYCHIATRY & NEUROLOGY

## 2021-03-08 PROCEDURE — 83921 ORGANIC ACID SINGLE QUANT: CPT

## 2021-03-08 PROCEDURE — 83090 ASSAY OF HOMOCYSTEINE: CPT | Performed by: PSYCHIATRY & NEUROLOGY

## 2021-03-08 PROCEDURE — 36415 COLL VENOUS BLD VENIPUNCTURE: CPT | Performed by: PSYCHIATRY & NEUROLOGY

## 2021-03-08 PROCEDURE — 99214 OFFICE O/P EST MOD 30 MIN: CPT | Performed by: PSYCHIATRY & NEUROLOGY

## 2021-03-08 NOTE — PROGRESS NOTES
Subjective:     Patient ID: Kera Sage is a 50 y.o. female.    The patient is a 50-year-old right-handed female with history of anxiety, depression, and ADHD who presents to the neurology clinic today as an established patient for follow-up for an abnormal EEG and seizures.  The patient was last seen on March 4, 2020.  She was a new patient in May 2019.  She reported in her mid 20s she was at a wedding and had difficulty remembering what she was told.  She started having to write things down.  She also reports some time lapses while she drove a couple times a week.  She was on Keppra for 3 years that improved her symptoms but stopped due to transaminitis.  An EEG in 2015 showed left temporal sharps and we did a 72-hour EEG back in August 2019 that showed focal intermittent left temporal slowing left temporal interictal epileptiform discharges.  We started her on Vimpat.  She continues on her milligrams twice a day with no side effects.  She has done neuropsych testing in the past.  Starting to worry about her symptoms.  Thinks that they are worsening.  Has trouble multitasking.  Retrieval has gotten bad.  Having trouble learning, no light bulb moments.  Has trouble remembering a grammar rule and spelling has gotten bad.  Gets confusion when she travels or in new places.  Has trouble remembering people's names and faces.  Worries about continued worsening.      The following portions of the patient's history were reviewed and updated as appropriate: allergies, current medications, past family history, past medical history, past social history, past surgical history and problem list.    Review of Systems   Respiratory: Negative for cough, chest tightness and shortness of breath.    Cardiovascular: Negative for chest pain, palpitations and leg swelling.   Neurological: Negative for seizures.        Objective:    Neurologic Exam    Physical Exam    Assessment/Plan:    The patient is a 50-year-old right-handed female  with history of anxiety, depression, and ADHD who presents today for follow-up.    1.  Left temporal lobe epilepsy-the patient has not had any definitive seizures but is having some cognitive changes.  We will start with some blood work.  The patient would like to hold off on any prolonged EEG monitoring.  She was agreeable to neuropsych testing.  I suspect that this is related to her mood, ADHD, and possibly increased stress.  I will see the patient back after her neuropsych testing is completed.    A total of 30 minutes of time was spent on this encounter today.  This included reviewing the patient's records, face-to-face time, and documentation.       Problems Addressed this Visit     None      Visit Diagnoses     Cognitive changes    -  Primary    Relevant Orders    Folate    TSH Rfx On Abnormal To Free T4    Vitamin B12    Homocysteine    Methylmalonic Acid, Serum    Ambulatory Referral to Neuropsychology      Diagnoses       Codes Comments    Cognitive changes    -  Primary ICD-10-CM: R41.89  ICD-9-CM: 799.59

## 2021-03-09 ENCOUNTER — TREATMENT (OUTPATIENT)
Dept: PHYSICAL THERAPY | Facility: CLINIC | Age: 51
End: 2021-03-09

## 2021-03-09 DIAGNOSIS — M54.16 RADICULOPATHY, LUMBAR REGION: Primary | ICD-10-CM

## 2021-03-09 PROCEDURE — 97110 THERAPEUTIC EXERCISES: CPT | Performed by: PHYSICAL THERAPIST

## 2021-03-09 PROCEDURE — 97530 THERAPEUTIC ACTIVITIES: CPT | Performed by: PHYSICAL THERAPIST

## 2021-03-09 NOTE — PROGRESS NOTES
Re-Assessment / Re-Certification      Patient: Kera Sage   : 1970  Diagnosis/ICD-10 Code:  Radiculopathy, lumbar region [M54.16]  Referring practitioner: Anh Ramos MD  Date of Initial Visit: Type: THERAPY  Noted: 1/15/2021  Today's Date: 3/9/2021  Patient seen for 13 sessions      Subjective:     Subjective Questionnaire: Oswestry:   Clinical Progress: improved  Home Program Compliance: Yes  Treatment has included: therapeutic exercise, neuromuscular re-education, manual therapy and therapeutic activity    Subjective Evaluation    Pain  Current pain ratin  At worst pain ratin         Objective          Strength/Myotome Testing     Left Hip   Planes of Motion   Extension: 4+  Abduction: 4    Right Hip   Planes of Motion   Extension: 4+  Abduction: 4-    Tests     Left Pelvic Girdle/Sacrum   Negative: sacrum compression, gapping and thigh thrust.     Right Pelvic Girdle/Sacrum   Negative: sacrum compression, gapping and thigh thrust.     Left Hip   Negative Gaenslen's and scour.     Right Hip   Negative Gaenslen's and scour.       Assessment & Plan     Assessment  Impairments: impaired physical strength  Assessment details: Kera has met 2/3 STG and 2/4 LTG. She continues to exhibit R gluteus medius weakness but she is comfortable managing her condition with a home program. At this time she has reached a plateau of progress and is appropriate for discharge from PT.  Prognosis: good    Goals  Plan Goals: Plan Goals: STG (in 4 weeks)  1) Pt will report a decrease in pain to 3/10 max (not met)  2) Pt will be able to complete HEP with no increase in pain and with proper technique (met 3/9/2021)  3) Pt will have minimal tenderness and no trigger points at R external hip rotators (met 3/9/2021)    LTG (in 8 weeks)  1) Pt will score 10% lower on the Oswestry (not met)  2) Pt will demonstrate improved B hip strength to 5/5 throughout (not met)  3) Pt will be able to return to normal workouts with  only occasional complaints of pain (met 3/9/2021)  4) Pt will have normal pelvic alignment and negative KUN and Gilett's indicating reduced inflammation at the SI jt (met 3/9/2021)    Plan  Therapy options: will not be seen for skilled physical therapy services  Treatment plan discussed with: patient        Visit Diagnoses:    ICD-10-CM ICD-9-CM   1. Radiculopathy, lumbar region  M54.16 724.4       Progress toward previous goals: Partially Met          Recommendations: Discharge  Prognosis to achieve goals: good    PT Signature: Nehemias Muhammad PT, DPT, OCS    Based upon review of the patient's progress and continued therapy plan, it is my medical opinion that Kera Sage should discontinue physical therapy treatment at [facility]  [address] [place] [id number] [phone].    Signature: __________________________________  Anh Ramos MD    Timed:  Manual Therapy:         mins  49818;  Therapeutic Exercise:    15     mins  80768;     Neuromuscular Brina:        mins  46482;    Therapeutic Activity:     30     mins  17433;     Gait Training:           mins  52532;     Ultrasound:          mins  08520;    Electrical Stimulation:         mins  95683 ( );    Untimed:  Electrical Stimulation:         mins  98833 ( );  Mechanical Traction:         mins  99101;     Timed Treatment:   30   mins   Total Treatment:     45   mins

## 2021-03-13 LAB
Lab: NORMAL
METHYLMALONATE SERPL-SCNC: 109 NMOL/L (ref 0–378)

## 2021-03-30 ENCOUNTER — BULK ORDERING (OUTPATIENT)
Dept: CASE MANAGEMENT | Facility: OTHER | Age: 51
End: 2021-03-30

## 2021-03-30 DIAGNOSIS — Z23 IMMUNIZATION DUE: ICD-10-CM

## 2021-04-02 RX ORDER — BUPROPION HYDROCHLORIDE 300 MG/1
TABLET ORAL
Qty: 90 TABLET | Refills: 1 | Status: SHIPPED | OUTPATIENT
Start: 2021-04-02 | End: 2021-09-23

## 2021-04-22 ENCOUNTER — OFFICE VISIT (OUTPATIENT)
Dept: FAMILY MEDICINE CLINIC | Facility: CLINIC | Age: 51
End: 2021-04-22

## 2021-04-22 VITALS
RESPIRATION RATE: 15 BRPM | OXYGEN SATURATION: 98 % | WEIGHT: 144 LBS | TEMPERATURE: 97.3 F | BODY MASS INDEX: 21.82 KG/M2 | DIASTOLIC BLOOD PRESSURE: 72 MMHG | HEART RATE: 69 BPM | SYSTOLIC BLOOD PRESSURE: 110 MMHG | HEIGHT: 68 IN

## 2021-04-22 DIAGNOSIS — Z11.59 ENCOUNTER FOR HEPATITIS C SCREENING TEST FOR LOW RISK PATIENT: ICD-10-CM

## 2021-04-22 DIAGNOSIS — M54.50 CHRONIC RIGHT-SIDED LOW BACK PAIN WITHOUT SCIATICA: ICD-10-CM

## 2021-04-22 DIAGNOSIS — Z00.00 ANNUAL PHYSICAL EXAM: Primary | ICD-10-CM

## 2021-04-22 DIAGNOSIS — G89.29 CHRONIC RIGHT-SIDED LOW BACK PAIN WITHOUT SCIATICA: ICD-10-CM

## 2021-04-22 PROCEDURE — 99213 OFFICE O/P EST LOW 20 MIN: CPT | Performed by: FAMILY MEDICINE

## 2021-04-22 PROCEDURE — 99396 PREV VISIT EST AGE 40-64: CPT | Performed by: FAMILY MEDICINE

## 2021-04-22 NOTE — PROGRESS NOTES
"Chief Complaint  Annual Exam    Subjective          Kera Sage presents to [facility]  History of Present Illness  Annual exam  Exercises regularly.   Diet is good most of the time.   Was seen by gyn in 11/2020. Had mammo negative for concern and pap normal cytology.     She gets massage and does stretching, PT, has DDDz and scoliosis at L4-5. Doing everything recommended. She stays uncomfortable. She says lots of movements hurt, she can't twist or do some stretches, going on for one year now without much improvement. She is concerned about doing things she likes like playing tennis. After massage she has few good days then will hurt again.   She can't sit on hard surfaces.   On x ray back in 11/2020 she had moderate facet DDDz L5-S1.     Seen again for ADD, thought to have left temporal lobe epilepsy and now going for neuropsych testing holding on prolonged EEG. Going to have at the end of May 2021. Had vitamin studies.     Pt came back from [place] end of the month March and was tested prior to flight, was negative. She was also tested for antibodies to COVID prior to going and her abs still positive from her infection late last year.   She is still considering the vaccine.    Having trouble with her memory and seems to be progressing to the point where she is forgetting things moment to moment, very short term. She says will be working on activity then change to something else and she completely forgets what she was doing and where she was at with the task and starts over. When they ask her about events or trips she can't remember much. She is upset and t     Objective   Vital Signs:   /72 (BP Location: Right arm, Patient Position: Sitting, Cuff Size: Small Adult)   Pulse 69   Temp 97.3 °F (36.3 °C) (Infrared)   Resp 15   Ht 172.7 cm (67.99\")   Wt 65.3 kg (144 lb)   SpO2 98%   BMI 21.90 kg/m²     Physical Exam  Vitals reviewed.   Constitutional:       General: She is " not in acute distress.  HENT:      Right Ear: Tympanic membrane, ear canal and external ear normal.      Left Ear: Tympanic membrane, ear canal and external ear normal.      Nose: Nose normal.      Mouth/Throat:      Mouth: Mucous membranes are moist.      Pharynx: Oropharynx is clear. No oropharyngeal exudate or posterior oropharyngeal erythema.   Eyes:      General: No scleral icterus.        Right eye: No discharge.         Left eye: No discharge.      Conjunctiva/sclera: Conjunctivae normal.   Neck:      Thyroid: No thyromegaly.      Vascular: No carotid bruit.   Cardiovascular:      Rate and Rhythm: Normal rate and regular rhythm.      Heart sounds: Normal heart sounds. No murmur heard.   No friction rub. No gallop.    Pulmonary:      Effort: Pulmonary effort is normal. No respiratory distress.      Breath sounds: Normal breath sounds. No wheezing or rales.   Chest:      Chest wall: No tenderness.   Abdominal:      General: Bowel sounds are normal. There is no distension.      Palpations: Abdomen is soft. There is no mass.      Tenderness: There is no abdominal tenderness. There is no guarding.   Musculoskeletal:         General: No deformity.      Cervical back: Neck supple.      Right lower leg: No edema.      Left lower leg: No edema.      Comments: She is tender in the very upper posterior hip area as well as adjacent to the lower lumbar spine.   Lymphadenopathy:      Cervical: No cervical adenopathy.   Skin:     General: Skin is warm and dry.   Neurological:      Mental Status: She is alert and oriented to person, place, and time.      Motor: No abnormal muscle tone.      Coordination: Coordination normal.   Psychiatric:         Mood and Affect: Mood normal.         Behavior: Behavior normal.        Result Review :                 Assessment and Plan    Diagnoses and all orders for this visit:    1. Annual physical exam (Primary)  -     CBC & Differential  -     Comprehensive Metabolic Panel  -     Lipid  Panel    2. Chronic right-sided low back pain without sciatica  -     MRI Lumbar Spine Without Contrast; Future    3. Encounter for hepatitis C screening test for low risk patient  -     Hepatitis C Antibody        Follow Up   No follow-ups on file.  Patient was given instructions and counseling regarding her condition or for health maintenance advice. Please see specific information pulled into the AVS if appropriate.     Preventive counseling  We discussed the COVID vaccine again. She had antibody test come back positive recently.  I think overall vaccine is going to be a good idea for her to get in the long run.  Despite her not having serious disease from it I think it is important to prevent recurrence or carrying it to others.  Due for labs.  Has been over your for some labs and she is on medication that can affect blood counts and liver function.  Her cholesterol has been good in the past we will check in on it this year for her annual exam.  She has had follow-up with her gynecologist.  Following regularly for dental exams.  Her back continues to be a problem in simple everyday activities.  She has really done a lot of conservative therapy through physical therapy another methods with massage, etc.  She did have some degenerative change on x-ray last November as well some mild dextroscoliosis.  We will evaluate further at this time with MRI of the lumbar spine.  She is quite tearful today.  She is very upset again about the severity of her memory issues.  She gets confused a lot and easily and does not remember a great bit of events that have happened previously with her family and now it sleeping into every day activities from 1 activity to the next during a day span.  She is going for neuropsychological evaluation.  She is also upset about the diagnosis of left temporal epilepsy as she feels this has not really been demonstrated on prolonged EEG and the medication has not changed her symptoms at all.  It  has made it harder getting life insurance and disability and she is considering a second opinion on this diagnosis.  She is going to wait and see though with the neuropsychological evaluation if there is any further helpful information after this is performed.  Going to see her in few months after she has that evaluation.

## 2021-04-23 LAB
ALBUMIN SERPL-MCNC: 5.1 G/DL (ref 3.5–5.2)
ALBUMIN/GLOB SERPL: 2.4 G/DL
ALP SERPL-CCNC: 93 U/L (ref 39–117)
ALT SERPL-CCNC: 38 U/L (ref 1–33)
AST SERPL-CCNC: 25 U/L (ref 1–32)
BASOPHILS # BLD AUTO: 0.02 10*3/MM3 (ref 0–0.2)
BASOPHILS NFR BLD AUTO: 0.5 % (ref 0–1.5)
BILIRUB SERPL-MCNC: 0.6 MG/DL (ref 0–1.2)
BUN SERPL-MCNC: 25 MG/DL (ref 6–20)
BUN/CREAT SERPL: 27.2 (ref 7–25)
CALCIUM SERPL-MCNC: 10.5 MG/DL (ref 8.6–10.5)
CHLORIDE SERPL-SCNC: 100 MMOL/L (ref 98–107)
CHOLEST SERPL-MCNC: 199 MG/DL (ref 0–200)
CO2 SERPL-SCNC: 29.5 MMOL/L (ref 22–29)
CREAT SERPL-MCNC: 0.92 MG/DL (ref 0.57–1)
EOSINOPHIL # BLD AUTO: 0.07 10*3/MM3 (ref 0–0.4)
EOSINOPHIL NFR BLD AUTO: 1.6 % (ref 0.3–6.2)
ERYTHROCYTE [DISTWIDTH] IN BLOOD BY AUTOMATED COUNT: 12.5 % (ref 12.3–15.4)
GLOBULIN SER CALC-MCNC: 2.1 GM/DL
GLUCOSE SERPL-MCNC: 85 MG/DL (ref 65–99)
HCT VFR BLD AUTO: 46 % (ref 34–46.6)
HCV AB S/CO SERPL IA: <0.1 S/CO RATIO (ref 0–0.9)
HDLC SERPL-MCNC: 84 MG/DL (ref 40–60)
HGB BLD-MCNC: 15.3 G/DL (ref 12–15.9)
IMM GRANULOCYTES # BLD AUTO: 0.01 10*3/MM3 (ref 0–0.05)
IMM GRANULOCYTES NFR BLD AUTO: 0.2 % (ref 0–0.5)
LDLC SERPL CALC-MCNC: 102 MG/DL (ref 0–100)
LYMPHOCYTES # BLD AUTO: 1.09 10*3/MM3 (ref 0.7–3.1)
LYMPHOCYTES NFR BLD AUTO: 24.7 % (ref 19.6–45.3)
MCH RBC QN AUTO: 30.8 PG (ref 26.6–33)
MCHC RBC AUTO-ENTMCNC: 33.3 G/DL (ref 31.5–35.7)
MCV RBC AUTO: 92.7 FL (ref 79–97)
MONOCYTES # BLD AUTO: 0.43 10*3/MM3 (ref 0.1–0.9)
MONOCYTES NFR BLD AUTO: 9.8 % (ref 5–12)
NEUTROPHILS # BLD AUTO: 2.79 10*3/MM3 (ref 1.7–7)
NEUTROPHILS NFR BLD AUTO: 63.2 % (ref 42.7–76)
NRBC BLD AUTO-RTO: 0 /100 WBC (ref 0–0.2)
PLATELET # BLD AUTO: 294 10*3/MM3 (ref 140–450)
POTASSIUM SERPL-SCNC: 4.7 MMOL/L (ref 3.5–5.2)
PROT SERPL-MCNC: 7.2 G/DL (ref 6–8.5)
RBC # BLD AUTO: 4.96 10*6/MM3 (ref 3.77–5.28)
SODIUM SERPL-SCNC: 140 MMOL/L (ref 136–145)
TRIGL SERPL-MCNC: 75 MG/DL (ref 0–150)
VLDLC SERPL CALC-MCNC: 13 MG/DL (ref 5–40)
WBC # BLD AUTO: 4.41 10*3/MM3 (ref 3.4–10.8)

## 2021-05-24 ENCOUNTER — HOSPITAL ENCOUNTER (OUTPATIENT)
Dept: MRI IMAGING | Facility: HOSPITAL | Age: 51
Discharge: HOME | End: 2021-05-24
Admitting: FAMILY MEDICINE

## 2021-05-24 DIAGNOSIS — G89.29 CHRONIC RIGHT-SIDED LOW BACK PAIN WITHOUT SCIATICA: ICD-10-CM

## 2021-05-24 DIAGNOSIS — M54.50 CHRONIC RIGHT-SIDED LOW BACK PAIN WITHOUT SCIATICA: ICD-10-CM

## 2021-05-24 PROCEDURE — 72148 MRI LUMBAR SPINE W/O DYE: CPT

## 2021-06-17 DIAGNOSIS — G40.109 TEMPORAL LOBE EPILEPSY (HCC): ICD-10-CM

## 2021-06-18 RX ORDER — LACOSAMIDE 100 MG/1
100 TABLET ORAL EVERY 12 HOURS
Qty: 180 TABLET | Refills: 1 | Status: SHIPPED | OUTPATIENT
Start: 2021-06-18 | End: 2022-01-03

## 2021-06-18 RX ORDER — LACOSAMIDE 100 MG/1
TABLET, FILM COATED ORAL
Qty: 180 TABLET | Refills: 1 | Status: SHIPPED | OUTPATIENT
Start: 2021-06-18 | End: 2021-06-18 | Stop reason: SDUPTHER

## 2021-06-18 NOTE — TELEPHONE ENCOUNTER
THE PATIENT CALLED TO GET THE RX RESENT TO ANOTHER PHARMACY, THE ONE THAT WAS SENT EARLIER THIS MORNING THAT PHARMACY IS OUT OF THE MEDICATION, AND SHE NEEDS THE MEDICATION TODAY.     I HAVE UPDATED THE PHARMAC[facility]- [place] [address]

## 2021-07-02 RX ORDER — ESCITALOPRAM OXALATE 10 MG/1
TABLET ORAL
Qty: 90 TABLET | Refills: 1 | Status: SHIPPED | OUTPATIENT
Start: 2021-07-02 | End: 2022-01-03

## 2021-09-23 RX ORDER — BUPROPION HYDROCHLORIDE 300 MG/1
TABLET ORAL
Qty: 90 TABLET | Refills: 0 | Status: SHIPPED | OUTPATIENT
Start: 2021-09-23 | End: 2022-01-05

## 2022-01-01 DIAGNOSIS — G40.109 TEMPORAL LOBE EPILEPSY: ICD-10-CM

## 2022-01-03 RX ORDER — LACOSAMIDE 100 MG/1
TABLET, FILM COATED ORAL
Qty: 180 TABLET | Refills: 3 | Status: SHIPPED | OUTPATIENT
Start: 2022-01-03 | End: 2022-04-13 | Stop reason: SDUPTHER

## 2022-01-03 RX ORDER — ESCITALOPRAM OXALATE 10 MG/1
TABLET ORAL
Qty: 90 TABLET | Refills: 1 | Status: SHIPPED | OUTPATIENT
Start: 2022-01-03 | End: 2022-06-07

## 2022-01-03 NOTE — TELEPHONE ENCOUNTER
Rx Refill Note  Requested Prescriptions     Pending Prescriptions Disp Refills   • escitalopram (LEXAPRO) 10 MG tablet [Pharmacy Med Name: ESCITALOPRAM 10 MG TABLET] 90 tablet 1     Sig: TAKE 1 TABLET BY MOUTH EVERY DAY      Last office visit with prescribing clinician: 4/22/2021      Next office visit with prescribing clinician: 1/1/2022            Taty Prado MA  01/03/22, 14:16 EST

## 2022-01-03 NOTE — TELEPHONE ENCOUNTER
UPDATED ALYSHA IN CHART- DOES NOT HAVE FU APPT- [facility]

## 2022-01-04 NOTE — TELEPHONE ENCOUNTER
Rx Refill Note  Requested Prescriptions     Pending Prescriptions Disp Refills   • buPROPion XL (WELLBUTRIN XL) 300 MG 24 hr tablet [Pharmacy Med Name: BUPROPION HCL  MG TABLET] 90 tablet 0     Sig: TAKE 1 TABLET BY MOUTH EVERY DAY IN THE MORNING      Last office visit with prescribing clinician: 4/22/2021      Next office visit with prescribing clinician: Visit date not found            Nalini Goins MA  01/04/22, 14:59 EST

## 2022-01-04 NOTE — TELEPHONE ENCOUNTER
Her physical is due in April. Can we please try to get her on the books so she is up to date with the office? Thanks.

## 2022-01-05 RX ORDER — BUPROPION HYDROCHLORIDE 300 MG/1
TABLET ORAL
Qty: 90 TABLET | Refills: 0 | Status: SHIPPED | OUTPATIENT
Start: 2022-01-05 | End: 2022-04-04

## 2022-01-05 NOTE — TELEPHONE ENCOUNTER
When you call to get her in for April physical please let her know she has a MyChart message from neurology and is supposed to call so she can get her follow up set for march with Dr. Finnegan. It doesn't look like she saw that message. Thanks.

## 2022-02-21 ENCOUNTER — OFFICE VISIT (OUTPATIENT)
Dept: FAMILY MEDICINE CLINIC | Facility: CLINIC | Age: 52
End: 2022-02-21

## 2022-02-21 VITALS
OXYGEN SATURATION: 96 % | BODY MASS INDEX: 23.79 KG/M2 | HEIGHT: 68 IN | WEIGHT: 157 LBS | HEART RATE: 86 BPM | RESPIRATION RATE: 17 BRPM | DIASTOLIC BLOOD PRESSURE: 72 MMHG | TEMPERATURE: 96.6 F | SYSTOLIC BLOOD PRESSURE: 110 MMHG

## 2022-02-21 DIAGNOSIS — F41.9 ANXIETY: ICD-10-CM

## 2022-02-21 DIAGNOSIS — R41.3 MEMORY LOSS, SHORT TERM: ICD-10-CM

## 2022-02-21 DIAGNOSIS — R41.3 MEMORY LOSS, LONG TERM: Primary | ICD-10-CM

## 2022-02-21 PROCEDURE — 99213 OFFICE O/P EST LOW 20 MIN: CPT | Performed by: FAMILY MEDICINE

## 2022-03-21 ENCOUNTER — OFFICE VISIT (OUTPATIENT)
Dept: NEUROLOGY | Facility: CLINIC | Age: 52
End: 2022-03-21

## 2022-03-21 VITALS
WEIGHT: 154 LBS | SYSTOLIC BLOOD PRESSURE: 114 MMHG | BODY MASS INDEX: 23.34 KG/M2 | DIASTOLIC BLOOD PRESSURE: 72 MMHG | HEIGHT: 68 IN | HEART RATE: 80 BPM | OXYGEN SATURATION: 98 %

## 2022-03-21 DIAGNOSIS — G40.109 TEMPORAL LOBE EPILEPSY: Primary | ICD-10-CM

## 2022-03-21 DIAGNOSIS — R41.89 COGNITIVE CHANGES: ICD-10-CM

## 2022-03-21 DIAGNOSIS — R41.3 MEMORY LOSS: ICD-10-CM

## 2022-03-21 PROCEDURE — 99215 OFFICE O/P EST HI 40 MIN: CPT | Performed by: PSYCHIATRY & NEUROLOGY

## 2022-03-21 NOTE — PATIENT INSTRUCTIONS
[facility]  Court Finnegan MD  Neurology clinic  [phone]    With anti-seizure medications, you may initially notice side effects of fatigue, drowsiness, unsteadiness, and dizziness.  Other possible side effects include nausea, abdominal pain, headache, blurry or double vision, slurred speech and mood changes.  Generally, patients will noticed these symptoms when the medication is first started or with higher doses and will go away with time.    It is import to consistently take your medication every day.  Missing just one dose may put you at risk for a breakthrough seizure.  Consider using reminders on your phone or a pill box.    If you develop a rash, please call the neurology clinic immediately or notify another healthcare professional, as this may be potentially life-threatening.  If you are unable to reach a healthcare professional, go to the emergency room immediately for further evaluation.    If you develop thoughts of wanting to hurt yourself or others, please call the neurology clinic immediately to notify another healthcare professional.  If you are unable to reach a healthcare professional, go to the emergency room immediately for further evaluation.    Taking anti-seizure medications may increase the risk of birth defects.  If you are a female of child-bearing potential, it is recommended that you take folic acid (1-4 mg) daily.  This may reduce the risk of birth defects while pregnant and taking seizure medication.  If you become pregnant, contact our office immediately. You will need to be followed very closely (at least monthly appointments).  I also recommend contacting The North American Antiepileptic Drug Pregnancy Registry at www.aedpregnancyregistry.org or 1-788.770.3222.    It is the [place] state law that you cannot drive within 90 days of a seizure.    You should avoid certain activities that if you were to have a seizure, you could harm yourself or others. In  general, it is recommended that you avoid operating heavy machinery or power tools, swimming or taking baths by yourself (showers are ok), don't stand over open flames, don't get on high ladders or the roof.  I also recommend to avoid sleeping on your stomach.    For further information on epilepsy and resources available to patients and their families, please visit the [facility] at www.efky.org or call [phone].    **Check out the [facility]ile[facility]'s monthly Art Group Gathering.  They are [facility] at [facility], [address] [place] [address].  Call Ema Brewer at [phone] or email her at [email or web address] for the dates of future gatherings.**      **If you have having memory problems, consider HOBSCOTCH (Home-Based Self-management and Cognitive Training Changes lives).  It is an 8 week self-management program for adults with epilepsy and memory problems.  The program is free at the [facility].  Contact Emilia Tomas at [phone] or [email or web address].**    Check out My Epilepsy Story (MyEpilepsyStory.org).  Their goal is to foster the growth of young girls and women affected by epilepsy and encourage them not just to lives, but to THRIVE!  You can get involved by donating, sharing your story, or becoming an ambassador.  Services include educational resources and transportation.

## 2022-03-21 NOTE — PROGRESS NOTES
Subjective:     Patient ID: Kera Sage is a 51 y.o. female.    Ms. Sage is a 51-year-old right-handed female with history of anxiety, depression, and ADHD who presents to the neurology clinic today as an established patient for follow-up.  The patient was last seen on March 8, 2021.  She is new patient back in May 2019.  She reported that in her mid 20s she was at a wedding and had difficulty remembering what she was told.  She started having to write things down.  She also reported episodes of loss time while she drove a couple times a week.  She was started on levetiracetam in the past and was on it for 3 years.  This improved the episodes of loss time however the medication was stopped due to transaminitis.  She had an EEG in 2015 that showed left temporal sharps.  I did a 72-hour continuous EEG back in August 2019 that showed focal intermittent left temporal slowing and left temporal interictal epileptiform discharges.  We started her on Vimpat.  She is currently on 100 mg twice a day.  She has been concerned about her cognition.  She had neuropsychological testing done on July 8, 2021.  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  The patient reports that her memory is worse.  She is not sure what is going on and seems distressed.  Since I last saw her she was started on hormones.  She denies any definitive seizure activity.  She reports that her ADHD is not currently being treated but she does not feel like this is contributing to her cognitive issues.  The patient reports that she had done some brain games like luminosity however has not done any formal cognitive training.    The following portions of the patient's history were reviewed and updated as appropriate: allergies, current medications, past family history, past medical history, past social history, past surgical history and problem list.    Review of Systems     Objective:    Neurologic  Exam    Physical Exam    Assessment/Plan:    The patient is a 51-year-old right-handed female with history of anxiety, depression, and ADHD who presents today for follow-up.    1.  Left temporal lobe epilepsy-fortunately the patient has not had any definitive seizures since her last visit.  I recommend continuing on the Vimpat with no changes.    2.  Cognitive changes-we discussed her neuropsychological testing results.  She does not feel like her ADHD is affecting her cognition.  She is concerned that something else is going on.  We discussed that cognitive issues are very common in patients with epilepsy.  I recommend for her to try out the hopscot program with the epilepsy foundation.  Alternatively we could try a formal referral to a speech therapist for cognitive training as well.  We could also do a prolonged continuous EEG to make sure she is not having subclinical seizures.  The patient would like to think about this and get back with me.  The patient was open to seeing a cognitive disorder specialist.  Referral was placed to see Dr. Herr over at [place].  I think it would be a good idea to focus on things that we can intervene on.  She may want talk to her primary care physician about getting started back on a medication for her ADHD.    A total of 40 minutes of time was spent on encounter today.  This includes reviewing patient's records, face-to-face time, documentation.       Problems Addressed this Visit        Neuro    Memory loss    Relevant Orders    Ambulatory Referral to Neurology (Completed)      Other Visit Diagnoses     Temporal lobe epilepsy (HCC)    -  Primary    Cognitive changes          Diagnoses       Codes Comments    Temporal lobe epilepsy (HCC)    -  Primary ICD-10-CM: G40.109  ICD-9-CM: 345.40     Memory loss     ICD-10-CM: R41.3  ICD-9-CM: 780.93     Cognitive changes     ICD-10-CM: R41.89  ICD-9-CM: 799.59

## 2022-03-31 DIAGNOSIS — F90.0 ATTENTION DEFICIT HYPERACTIVITY DISORDER (ADHD), PREDOMINANTLY INATTENTIVE TYPE: Primary | ICD-10-CM

## 2022-04-04 RX ORDER — BUPROPION HYDROCHLORIDE 300 MG/1
TABLET ORAL
Qty: 90 TABLET | Refills: 1 | Status: SHIPPED | OUTPATIENT
Start: 2022-04-04 | End: 2022-08-22 | Stop reason: DRUGHIGH

## 2022-04-04 NOTE — TELEPHONE ENCOUNTER
Rx Refill Note  Requested Prescriptions     Pending Prescriptions Disp Refills   • buPROPion XL (WELLBUTRIN XL) 300 MG 24 hr tablet [Pharmacy Med Name: BUPROPION HCL  MG TABLET] 90 tablet 0     Sig: TAKE 1 TABLET BY MOUTH EVERY DAY IN THE MORNING      Last office visit with prescribing clinician: 2/21/2022      Next office visit with prescribing clinician: 6/3/2022            Taty Prado MA  04/04/22, 10:01 EDT

## 2022-04-12 ENCOUNTER — TELEPHONE (OUTPATIENT)
Dept: NEUROLOGY | Facility: CLINIC | Age: 52
End: 2022-04-12

## 2022-04-12 DIAGNOSIS — G40.109 TEMPORAL LOBE EPILEPSY: ICD-10-CM

## 2022-04-12 NOTE — TELEPHONE ENCOUNTER
Provider: DR. GAMBOA   Caller: SAILAJA   Relationship to Patient: PT   Phone Number: [phone]  Reason for Call: PT STATES THAT HER VIMPAT IS GOING TO COST 900 DOLLARS UNLESS WRITTEN WITH DISPENSE AS WRITTEN CODE. PLEASE FIX.   PT HAS LESS THAN 3 DAYS

## 2022-04-12 NOTE — TELEPHONE ENCOUNTER
SPOKE WITH THE PHARMACIST AND HE SAID RUNNING IT THRU USING THE GENERIC BRAND IS GOING TO COST $900    HOWEVER, IF WE SEND SCRIPT FOR NAME BRAND ONLY THEY MIGHT BE ABLE TO GET IT CHEAPER USING A COUPON/CO PAY CARD    I COULD NOT GET A SCRIPT FOR VIMPAT THAT DID NOT AUTOMATICALLY REVERT TO GENERIC

## 2022-04-13 RX ORDER — LEVETIRACETAM 500 MG/1
500 TABLET ORAL 2 TIMES DAILY
Qty: 60 TABLET | Refills: 11 | Status: SHIPPED | OUTPATIENT
Start: 2022-04-13 | End: 2022-05-13

## 2022-04-13 RX ORDER — LACOSAMIDE 100 MG/1
100 TABLET ORAL EVERY 12 HOURS
Qty: 180 TABLET | Refills: 3 | Status: SHIPPED | OUTPATIENT
Start: 2022-04-13 | End: 2022-04-13 | Stop reason: CLARIF

## 2022-04-13 RX ORDER — LACOSAMIDE 100 MG/1
100 TABLET, FILM COATED ORAL EVERY 12 HOURS SCHEDULED
Qty: 180 TABLET | Refills: 3 | Status: SHIPPED | OUTPATIENT
Start: 2022-04-13 | End: 2022-08-22 | Stop reason: ALTCHOICE

## 2022-04-13 NOTE — TELEPHONE ENCOUNTER
Spoke with patient and changing her Vimpat script to ZARI did not help.  She has a coupon card, however, your only allowed a maxium amount of assistance per Calendar year and she has exhausted the amount the coupon will cover and it is still too expensive    Patient wanting to know what to do ?  Change medications ?

## 2022-04-13 NOTE — TELEPHONE ENCOUNTER
If she cannot afford her current prescription, and we do not have any samples, then we likely will need to switch her to another medication.  Has she tried anything else in the past that she would like to go back to?

## 2022-04-13 NOTE — TELEPHONE ENCOUNTER
PT IS CALLING TO SEE IF OUR OFFICE HAS FOUND OUT WHAT SHE IS TO DO FOR HER RX.? CAN YOU PLEASE CALL HER BACK     PLEASE ADVISE THANKS .

## 2022-04-13 NOTE — TELEPHONE ENCOUNTER
Ok, I will send in a prescription.  The starting dose is 500 mg BID.  Common side effects include drowsiness and mood changes.

## 2022-04-13 NOTE — TELEPHONE ENCOUNTER
LM FOR PATIENT LETTING HER KNOW THAT SCRIPT SENT TO HER PHARM AND DR LEVI MESSAGE FOR HER- ASKED THAT SHE CALL IF SHE HAS ANY QUESTIONS

## 2022-05-02 DIAGNOSIS — F90.8 ADHD, ADULT RESIDUAL TYPE: Primary | ICD-10-CM

## 2022-06-06 NOTE — TELEPHONE ENCOUNTER
Rx Refill Note  Requested Prescriptions     Pending Prescriptions Disp Refills   • escitalopram (LEXAPRO) 10 MG tablet [Pharmacy Med Name: ESCITALOPRAM 10 MG TABLET] 90 tablet 1     Sig: TAKE 1 TABLET BY MOUTH EVERY DAY      Last office visit with prescribing clinician: 2/21/2022      Next office visit with prescribing clinician: Visit date not found            Elena Doe LPN  06/06/22, 13:10 EDT

## 2022-06-07 RX ORDER — ESCITALOPRAM OXALATE 10 MG/1
TABLET ORAL
Qty: 90 TABLET | Refills: 1 | Status: SHIPPED | OUTPATIENT
Start: 2022-06-07 | End: 2023-01-09

## 2022-06-16 DIAGNOSIS — F90.8 ADHD, ADULT RESIDUAL TYPE: ICD-10-CM

## 2022-07-18 DIAGNOSIS — F90.8 ADHD, ADULT RESIDUAL TYPE: ICD-10-CM

## 2022-07-18 NOTE — TELEPHONE ENCOUNTER
Rx Refill Note  Requested Prescriptions     Pending Prescriptions Disp Refills   • lisdexamfetamine (VYVANSE) 60 MG capsule 30 capsule 0     Sig: Take 1 capsule by mouth Every Morning      Last office visit with prescribing clinician: 2/21/2022      Next office visit with prescribing clinician: 8/22/2022            Elena Doe LPN  07/18/22, 11:23 EDT

## 2022-08-22 ENCOUNTER — OFFICE VISIT (OUTPATIENT)
Dept: FAMILY MEDICINE CLINIC | Facility: CLINIC | Age: 52
End: 2022-08-22

## 2022-08-22 VITALS
HEIGHT: 68 IN | WEIGHT: 141 LBS | OXYGEN SATURATION: 99 % | TEMPERATURE: 98.2 F | RESPIRATION RATE: 19 BRPM | SYSTOLIC BLOOD PRESSURE: 122 MMHG | BODY MASS INDEX: 21.37 KG/M2 | DIASTOLIC BLOOD PRESSURE: 70 MMHG | HEART RATE: 113 BPM

## 2022-08-22 DIAGNOSIS — F90.8 ADHD, ADULT RESIDUAL TYPE: Primary | ICD-10-CM

## 2022-08-22 DIAGNOSIS — Z79.899 MEDICATION MANAGEMENT: ICD-10-CM

## 2022-08-22 PROCEDURE — 99213 OFFICE O/P EST LOW 20 MIN: CPT | Performed by: FAMILY MEDICINE

## 2022-08-22 RX ORDER — BUPROPION HYDROCHLORIDE 150 MG/1
150 TABLET ORAL DAILY
Qty: 30 TABLET | Refills: 0 | Status: SHIPPED | OUTPATIENT
Start: 2022-08-22 | End: 2022-09-15 | Stop reason: SDUPTHER

## 2022-08-22 RX ORDER — LEVETIRACETAM 500 MG/1
TABLET ORAL
COMMUNITY
Start: 2022-04-13

## 2022-08-27 LAB
ALBUMIN SERPL-MCNC: 4.8 G/DL (ref 3.8–4.9)
ALBUMIN/GLOB SERPL: 2.1 {RATIO} (ref 1.2–2.2)
ALP SERPL-CCNC: 87 IU/L (ref 44–121)
ALT SERPL-CCNC: 48 IU/L (ref 0–32)
AST SERPL-CCNC: 31 IU/L (ref 0–40)
BILIRUB SERPL-MCNC: 0.5 MG/DL (ref 0–1.2)
BUN SERPL-MCNC: 25 MG/DL (ref 6–24)
BUN/CREAT SERPL: 28 (ref 9–23)
CALCIUM SERPL-MCNC: 9.7 MG/DL (ref 8.7–10.2)
CHLORIDE SERPL-SCNC: 104 MMOL/L (ref 96–106)
CO2 SERPL-SCNC: 26 MMOL/L (ref 20–29)
CREAT SERPL-MCNC: 0.88 MG/DL (ref 0.57–1)
DRUGS UR: NORMAL
EGFRCR-CYS SERPLBLD CKD-EPI 2021: 78 ML/MIN/1.73
GLOBULIN SER CALC-MCNC: 2.3 G/DL (ref 1.5–4.5)
GLUCOSE SERPL-MCNC: 82 MG/DL (ref 65–99)
POTASSIUM SERPL-SCNC: 4.4 MMOL/L (ref 3.5–5.2)
PROT SERPL-MCNC: 7.1 G/DL (ref 6–8.5)
SODIUM SERPL-SCNC: 141 MMOL/L (ref 134–144)

## 2022-09-15 NOTE — TELEPHONE ENCOUNTER
Rx Refill Note  Requested Prescriptions      No prescriptions requested or ordered in this encounter      Last office visit with prescribing clinician: 8/22/2022      Next office visit with prescribing clinician: 2/24/2023            Elena Doe LPN  09/15/22, 17:57 EDT

## 2022-09-16 RX ORDER — BUPROPION HYDROCHLORIDE 150 MG/1
150 TABLET ORAL DAILY
Qty: 90 TABLET | Refills: 2 | Status: SHIPPED | OUTPATIENT
Start: 2022-09-16

## 2022-09-22 DIAGNOSIS — F90.8 ADHD, ADULT RESIDUAL TYPE: ICD-10-CM

## 2022-09-23 NOTE — TELEPHONE ENCOUNTER
Rx Refill Note  Requested Prescriptions     Pending Prescriptions Disp Refills   • lisdexamfetamine (VYVANSE) 60 MG capsule 30 capsule 0     Sig: Take 1 capsule by mouth Every Morning      Last office visit with prescribing clinician: 8/22/2022      Next office visit with prescribing clinician: 2/24/2023            Elena Doe LPN  09/23/22, 09:49 EDT

## 2022-10-28 DIAGNOSIS — F90.8 ADHD, ADULT RESIDUAL TYPE: ICD-10-CM

## 2022-12-08 ENCOUNTER — OFFICE VISIT (OUTPATIENT)
Dept: FAMILY MEDICINE CLINIC | Facility: CLINIC | Age: 52
End: 2022-12-08

## 2022-12-08 VITALS
OXYGEN SATURATION: 95 % | DIASTOLIC BLOOD PRESSURE: 70 MMHG | HEIGHT: 68 IN | RESPIRATION RATE: 18 BRPM | HEART RATE: 80 BPM | WEIGHT: 144.3 LBS | BODY MASS INDEX: 21.87 KG/M2 | SYSTOLIC BLOOD PRESSURE: 128 MMHG | TEMPERATURE: 96.8 F

## 2022-12-08 DIAGNOSIS — R10.32 LEFT LOWER QUADRANT PAIN: Primary | ICD-10-CM

## 2022-12-08 PROCEDURE — 99213 OFFICE O/P EST LOW 20 MIN: CPT | Performed by: NURSE PRACTITIONER

## 2022-12-09 LAB
BASOPHILS # BLD AUTO: 0.04 10*3/MM3 (ref 0–0.2)
BASOPHILS NFR BLD AUTO: 0.7 % (ref 0–1.5)
EOSINOPHIL # BLD AUTO: 0.07 10*3/MM3 (ref 0–0.4)
EOSINOPHIL NFR BLD AUTO: 1.2 % (ref 0.3–6.2)
ERYTHROCYTE [DISTWIDTH] IN BLOOD BY AUTOMATED COUNT: 11.6 % (ref 12.3–15.4)
HCT VFR BLD AUTO: 40 % (ref 34–46.6)
HGB BLD-MCNC: 13.2 G/DL (ref 12–15.9)
IMM GRANULOCYTES # BLD AUTO: 0.02 10*3/MM3 (ref 0–0.05)
IMM GRANULOCYTES NFR BLD AUTO: 0.3 % (ref 0–0.5)
LYMPHOCYTES # BLD AUTO: 1.55 10*3/MM3 (ref 0.7–3.1)
LYMPHOCYTES NFR BLD AUTO: 26.2 % (ref 19.6–45.3)
MCH RBC QN AUTO: 30.2 PG (ref 26.6–33)
MCHC RBC AUTO-ENTMCNC: 33 G/DL (ref 31.5–35.7)
MCV RBC AUTO: 91.5 FL (ref 79–97)
MONOCYTES # BLD AUTO: 0.4 10*3/MM3 (ref 0.1–0.9)
MONOCYTES NFR BLD AUTO: 6.8 % (ref 5–12)
NEUTROPHILS # BLD AUTO: 3.83 10*3/MM3 (ref 1.7–7)
NEUTROPHILS NFR BLD AUTO: 64.8 % (ref 42.7–76)
NRBC BLD AUTO-RTO: 0 /100 WBC (ref 0–0.2)
PLATELET # BLD AUTO: 334 10*3/MM3 (ref 140–450)
RBC # BLD AUTO: 4.37 10*6/MM3 (ref 3.77–5.28)
WBC # BLD AUTO: 5.91 10*3/MM3 (ref 3.4–10.8)

## 2022-12-20 DIAGNOSIS — F90.8 ADHD, ADULT RESIDUAL TYPE: ICD-10-CM

## 2022-12-21 NOTE — TELEPHONE ENCOUNTER
Rx Refill Note  Requested Prescriptions     Pending Prescriptions Disp Refills   • lisdexamfetamine (VYVANSE) 60 MG capsule 30 capsule 0     Sig: Take 1 capsule by mouth Every Morning      Last office visit with prescribing clinician: 8/22/2022   Last telemedicine visit with prescribing clinician: 2/24/2023   Next office visit with prescribing clinician: 2/24/2023                         Would you like a call back once the refill request has been completed: [] Yes [] No    If the office needs to give you a call back, can they leave a voicemail: [] Yes [] No    Taty Prado MA  12/21/22, 08:35 EST     Updated contract

## 2023-01-09 RX ORDER — ESCITALOPRAM OXALATE 10 MG/1
TABLET ORAL
Qty: 90 TABLET | Refills: 1 | Status: SHIPPED | OUTPATIENT
Start: 2023-01-09

## 2023-01-26 DIAGNOSIS — F90.8 ADHD, ADULT RESIDUAL TYPE: ICD-10-CM

## 2023-01-26 NOTE — TELEPHONE ENCOUNTER
Rx Refill Note  Requested Prescriptions     Pending Prescriptions Disp Refills   • lisdexamfetamine (VYVANSE) 60 MG capsule 30 capsule 1     Sig: Take 1 capsule by mouth Every Morning      Last office visit with prescribing clinician: Visit date not found   Last telemedicine visit with prescribing clinician: 2/24/2023   Next office visit with prescribing clinician: Visit date not found                         Would you like a call back once the refill request has been completed: [] Yes [] No    If the office needs to give you a call back, can they leave a voicemail: [] Yes [] No    Domenico Ring  01/26/23, 09:11 EST

## 2023-03-02 DIAGNOSIS — F90.8 ADHD, ADULT RESIDUAL TYPE: ICD-10-CM

## 2023-03-02 NOTE — TELEPHONE ENCOUNTER
Rx Refill Note  Requested Prescriptions     Pending Prescriptions Disp Refills   • lisdexamfetamine (VYVANSE) 60 MG capsule 30 capsule 0     Sig: Take 1 capsule by mouth Every Morning      Last office visit with prescribing clinician: 8/22/2022   Last telemedicine visit with prescribing clinician: Visit date not found   Next office visit with prescribing clinician: Visit date not found                         Would you like a call back once the refill request has been completed: [] Yes [] No    If the office needs to give you a call back, can they leave a voicemail: [] Yes [] No    Elena Doe LPN  03/02/23, 14:36 EST

## 2023-03-06 RX ORDER — VALACYCLOVIR HYDROCHLORIDE 500 MG/1
500 TABLET, FILM COATED ORAL DAILY
Qty: 90 TABLET | Refills: 1 | Status: SHIPPED | OUTPATIENT
Start: 2023-03-06

## 2023-03-07 ENCOUNTER — TELEPHONE (OUTPATIENT)
Dept: FAMILY MEDICINE CLINIC | Facility: CLINIC | Age: 53
End: 2023-03-07
Payer: COMMERCIAL

## 2023-03-07 NOTE — TELEPHONE ENCOUNTER
PT CALLED AND STATED SHE IS HAVING NERVE PAIN ON HER LEFT SIDE - NECK, BACK OF HEAD, BACK OF SHOULDER, LEFT BREAST, UNDER THE ARM AND BACK WHERE THE BRA STRAP HITS. PT STATES SHE BELIEVES IT IS SHINGLES STARTING BUT SHE HAS NEVER HAS SHINGLES BEFORE. PT IS CURRENTLY TAKING VALACYCLOVIR AND KNOWS THAT CAN HELP WITH SHINGLES SYMPTOMS BUT WANTED TO KNOW IF DR ALVAREZ RECOMMENDS ANYTHING ELSE. PT ONLY WANTS TO SEE DR ALVAREZ AND FIRST APPT IS NOT UNTIL 3/27.     PLEASE ADVISE. THANK YOU.

## 2023-03-08 NOTE — TELEPHONE ENCOUNTER
CALLED PT AND SHE STATES SHE DOES NOT CURRENTLY HAVE A RASH ANYWHERE JUST HAVING THE NERVE PAIN. PT SCHEDULED FOR 3PM TOMORROW 3/8 WITH DR ALVAREZ. THANK YOU.

## 2023-03-08 NOTE — TELEPHONE ENCOUNTER
Please call pt. Does she have a rash yet? This seems like a lot of territory to cover for shingles. Pt should be seen. You can double book me tomorrow at 2:30 and tell this pt to come in for 3 pm visit.

## 2023-04-19 DIAGNOSIS — F90.8 ADHD, ADULT RESIDUAL TYPE: ICD-10-CM

## 2023-04-19 NOTE — TELEPHONE ENCOUNTER
Rx Refill Note  Requested Prescriptions     Pending Prescriptions Disp Refills   • lisdexamfetamine (VYVANSE) 60 MG capsule 30 capsule 0     Sig: Take 1 capsule by mouth Every Morning      Last office visit with prescribing clinician: 8/22/2022   Last telemedicine visit with prescribing clinician: Visit date not found   Next office visit with prescribing clinician: Visit date not found                         Would you like a call back once the refill request has been completed: [] Yes [] No    If the office needs to give you a call back, can they leave a voicemail: [] Yes [] No    Siri Vera MA  04/19/23, 14:38 EDT

## 2023-05-10 RX ORDER — LEVETIRACETAM 500 MG/1
TABLET ORAL
Qty: 180 TABLET | Refills: 3 | Status: SHIPPED | OUTPATIENT
Start: 2023-05-10

## 2024-01-22 ENCOUNTER — OFFICE VISIT (OUTPATIENT)
Dept: INTERNAL MEDICINE | Facility: CLINIC | Age: 54
End: 2024-01-22
Payer: COMMERCIAL

## 2024-01-22 VITALS
TEMPERATURE: 97.8 F | WEIGHT: 158 LBS | DIASTOLIC BLOOD PRESSURE: 54 MMHG | HEART RATE: 90 BPM | BODY MASS INDEX: 24.03 KG/M2 | SYSTOLIC BLOOD PRESSURE: 104 MMHG | OXYGEN SATURATION: 97 %

## 2024-01-22 DIAGNOSIS — R63.4 WEIGHT LOSS: ICD-10-CM

## 2024-01-22 DIAGNOSIS — F90.8 ADHD, ADULT RESIDUAL TYPE: Primary | ICD-10-CM

## 2024-01-22 PROCEDURE — 99213 OFFICE O/P EST LOW 20 MIN: CPT | Performed by: NURSE PRACTITIONER

## 2024-01-22 NOTE — PROGRESS NOTES
"Chief Complaint  Establish Care and weight management (Pt states that about she thinks her hormones are causing weight gain. Pt states that she works out 5 days a week, , has tried estrogen patch and testosterone, thickened uterus and had biopsy and pt d/c hormones. Pt has labs from Dr. Hopson, integrative medicine. )    Kimberlee Sage presents to [facility]  History of Present Illness  She is here to establish care and for further evaluation ADHD, inattentive type.     She was initially diagnosed with ADHD around age 33. She tried different medications, but Vyvanse has been the medication she has consistantly responded well to. She was up to 60 mg once per day, and has not been taking it for about 1 year after Dr. Anh Ramos left. She does not like to take it per she does not like to take a lot of medication. She will take drug vacations every once in a while and then will return to taking the medication.     Weight: she was the same weight for many years until COVID19 where she was eating more and drinking more alcohol. She is a , she is eating a healthy diet. She is 5'8\" and weighs 158 lb, BMI is 24.3, but she is more comfortable at 130's. She now drinks only 2-4 glasses of wine per week. About 8 oz glasses. She exercises 5 days per week, very little cardio. She is trying to increase her protein intake, does try to eat a calorie-defacite. She does use My [employer]itness Pal.     She recently had lab-work drawn by Dr. Hopson, holistic provider, 12/08/2023. There were multiple lab values, including thyroid panel, which was all unremarkable.   She is doing neuro-feedback to help with her anxiety, which has really helped.           Objective   Vital Signs:  /54 (BP Location: Left arm, Patient Position: Sitting, Cuff Size: Adult)   Pulse 90   Temp 97.8 °F (36.6 °C) (Infrared)   Wt 71.7 kg (158 lb)   SpO2 97%   BMI 24.03 kg/m² " "  Estimated body mass index is 24.03 kg/m² as calculated from the following:    Height as of 12/8/22: 172.7 cm (67.99\").    Weight as of this encounter: 71.7 kg (158 lb).       BMI is within normal parameters. No other follow-up for BMI required.      Physical Exam  Vitals reviewed.   Constitutional:       General: She is not in acute distress.     Appearance: Normal appearance. She is normal weight. She is not ill-appearing, toxic-appearing or diaphoretic.   Cardiovascular:      Rate and Rhythm: Normal rate and regular rhythm.      Pulses: Normal pulses.      Heart sounds: Normal heart sounds.   Pulmonary:      Effort: Pulmonary effort is normal.      Breath sounds: Normal breath sounds.   Skin:     Capillary Refill: Capillary refill takes less than 2 seconds.   Neurological:      General: No focal deficit present.      Mental Status: She is alert and oriented to person, place, and time. Mental status is at baseline.   Psychiatric:         Attention and Perception: Attention normal.         Mood and Affect: Mood and affect normal.         Speech: Speech is rapid and pressured (mild).         Behavior: Behavior normal. Behavior is cooperative.         Thought Content: Thought content normal.         Cognition and Memory: Cognition and memory normal.         Judgment: Judgment normal.        Result Review :                   Assessment and Plan   Patient is a pleasant 53-year-old female with ADHD, inattentive type, endometriosis, unspecified history of seizures, anxiety here to establish care, discuss restarting Vyvanse, and discussion about weight loss.      Diagnoses and all orders for this visit:    1. ADHD, adult residual type (Primary)  Comments:  Controlled-substance agreement reviewed and signed by patient and provider.   ALYSHA reviewed and is appropriate.  Orders:  -     ToxASSURE Select 13 (MW) - Urine, Clean Catch    2. Weight loss  Comments:  Recommend Meditarrean diet, speaking to a registered " dietician.             Follow Up   Return in about 6 months (around 7/22/2024) for Annual physical.  Patient was given instructions and counseling regarding her condition or for health maintenance advice. Please see specific information pulled into the AVS if appropriate.

## 2024-01-24 ENCOUNTER — PATIENT ROUNDING (BHMG ONLY) (OUTPATIENT)
Dept: INTERNAL MEDICINE | Facility: CLINIC | Age: 54
End: 2024-01-24
Payer: COMMERCIAL

## 2024-01-24 ENCOUNTER — PATIENT MESSAGE (OUTPATIENT)
Dept: INTERNAL MEDICINE | Facility: CLINIC | Age: 54
End: 2024-01-24
Payer: COMMERCIAL

## 2024-01-24 DIAGNOSIS — F90.8 ADHD, ADULT RESIDUAL TYPE: ICD-10-CM

## 2024-01-28 LAB — DRUGS UR: NORMAL

## 2024-02-08 RX ORDER — LISDEXAMFETAMINE DIMESYLATE CAPSULES 60 MG/1
60 CAPSULE ORAL EVERY MORNING
Qty: 30 CAPSULE | Refills: 0 | Status: SHIPPED | OUTPATIENT
Start: 2024-02-08

## 2024-02-09 NOTE — TELEPHONE ENCOUNTER
From: Sarah MARK  To: Kera Sage  Sent: 1/24/2024 2:18 PM EST  Subject: WELCOME TO PROSPECT    01/24/24     DEAR Kera Sage ,    My name is Sarah and I am with [facility].    I want to officially welcome you to our practice and ask about your recent visit.     If you could tell me about your visit with us. What things went well?    We're always looking for ways to make our patients' experiences even better. Do you have recommendations on ways we may improve?    Overall were you satisfied with your first visit to our practice?     I appreciate you taking the time to speak with me today. Is there anything else I can do for you?      Thank you, and have a great day.    Sarah RM

## 2025-04-11 ENCOUNTER — OFFICE (OUTPATIENT)
Dept: URBAN - METROPOLITAN AREA CLINIC 76 | Facility: CLINIC | Age: 55
End: 2025-04-11
Payer: COMMERCIAL

## 2025-04-11 VITALS
WEIGHT: 140 LBS | DIASTOLIC BLOOD PRESSURE: 62 MMHG | HEART RATE: 89 BPM | SYSTOLIC BLOOD PRESSURE: 94 MMHG | OXYGEN SATURATION: 97 % | HEIGHT: 68 IN

## 2025-04-11 DIAGNOSIS — R74.8 ABNORMAL LEVELS OF OTHER SERUM ENZYMES: ICD-10-CM

## 2025-04-11 DIAGNOSIS — R93.3 ABNORMAL FINDINGS ON DIAGNOSTIC IMAGING OF OTHER PARTS OF DI: ICD-10-CM

## 2025-04-11 PROCEDURE — 99204 OFFICE O/P NEW MOD 45 MIN: CPT | Performed by: NURSE PRACTITIONER

## (undated) DEVICE — TUBING, SUCTION, 1/4" X 10', STRAIGHT: Brand: MEDLINE

## (undated) DEVICE — SENSR O2 OXIMAX FNGR A/ 18IN NONSTR

## (undated) DEVICE — CANN NASL CO2 TRULINK W/O2 A/

## (undated) DEVICE — Device: Brand: DEFENDO AIR/WATER/SUCTION AND BIOPSY VALVE

## (undated) DEVICE — GOWN ,SIRUS,NONREINFORCED SMALL: Brand: MEDLINE

## (undated) DEVICE — THE TORRENT IRRIGATION SCOPE CONNECTOR IS USED WITH THE TORRENT IRRIGATION TUBING TO PROVIDE IRRIGATION FLUIDS SUCH AS STERILE WATER DURING GASTROINTESTINAL ENDOSCOPIC PROCEDURES WHEN USED IN CONJUNCTION WITH AN IRRIGATION PUMP (OR ELECTROSURGICAL UNIT).: Brand: TORRENT